# Patient Record
Sex: FEMALE | Race: WHITE | NOT HISPANIC OR LATINO | Employment: OTHER | URBAN - METROPOLITAN AREA
[De-identification: names, ages, dates, MRNs, and addresses within clinical notes are randomized per-mention and may not be internally consistent; named-entity substitution may affect disease eponyms.]

---

## 2017-04-04 ENCOUNTER — AMBULATORY - HEALTHEAST (OUTPATIENT)
Dept: SURGERY | Facility: CLINIC | Age: 67
End: 2017-04-04

## 2017-04-04 DIAGNOSIS — Z98.84 S/P BARIATRIC SURGERY: ICD-10-CM

## 2017-04-04 DIAGNOSIS — K91.2 OTHER AND UNSPECIFIED POSTSURGICAL NONABSORPTION: ICD-10-CM

## 2017-04-04 DIAGNOSIS — E55.9 VITAMIN D DEFICIENCY: ICD-10-CM

## 2017-04-04 DIAGNOSIS — K90.9 UNSPECIFIED INTESTINAL MALABSORPTION: ICD-10-CM

## 2017-08-04 ENCOUNTER — OFFICE VISIT - HEALTHEAST (OUTPATIENT)
Dept: SURGERY | Facility: CLINIC | Age: 67
End: 2017-08-04

## 2017-08-04 DIAGNOSIS — K91.2 POSTOPERATIVE MALABSORPTION: ICD-10-CM

## 2017-08-04 RX ORDER — TRAMADOL HYDROCHLORIDE 50 MG/1
1 TABLET ORAL EVERY MORNING
Status: SHIPPED | COMMUNITY
Start: 2017-06-30

## 2017-08-04 ASSESSMENT — MIFFLIN-ST. JEOR: SCORE: 1251.17

## 2018-08-31 ENCOUNTER — OFFICE VISIT - HEALTHEAST (OUTPATIENT)
Dept: SURGERY | Facility: CLINIC | Age: 68
End: 2018-08-31

## 2018-08-31 DIAGNOSIS — K91.2 POSTOPERATIVE MALABSORPTION: ICD-10-CM

## 2018-08-31 DIAGNOSIS — R63.2 HYPERPHAGIA: ICD-10-CM

## 2018-08-31 RX ORDER — TRIAMCINOLONE ACETONIDE 1 MG/G
1 CREAM TOPICAL 2 TIMES DAILY
Status: SHIPPED | COMMUNITY
Start: 2018-06-18

## 2018-08-31 ASSESSMENT — MIFFLIN-ST. JEOR: SCORE: 1269.32

## 2019-02-12 ENCOUNTER — COMMUNICATION - HEALTHEAST (OUTPATIENT)
Dept: SURGERY | Facility: CLINIC | Age: 69
End: 2019-02-12

## 2019-08-09 ENCOUNTER — AMBULATORY - HEALTHEAST (OUTPATIENT)
Dept: SURGERY | Facility: CLINIC | Age: 69
End: 2019-08-09

## 2019-08-09 ENCOUNTER — COMMUNICATION - HEALTHEAST (OUTPATIENT)
Dept: SURGERY | Facility: CLINIC | Age: 69
End: 2019-08-09

## 2019-08-09 DIAGNOSIS — Z98.84 S/P BARIATRIC SURGERY: ICD-10-CM

## 2019-08-09 DIAGNOSIS — K90.9 INTESTINAL MALABSORPTION: ICD-10-CM

## 2019-08-09 DIAGNOSIS — K91.2 POSTSURGICAL MALABSORPTION: ICD-10-CM

## 2019-12-16 ENCOUNTER — RECORDS - HEALTHEAST (OUTPATIENT)
Dept: ADMINISTRATIVE | Facility: OTHER | Age: 69
End: 2019-12-16

## 2019-12-16 LAB
CHOLEST SERPL-MCNC: 198 MG/DL
HBA1C MFR BLD: 6 % (ref 4.2–5.6)
HDLC SERPL-MCNC: 73 MG/DL
LDLC SERPL CALC-MCNC: 108 MG/DL
NON HDL CHOL. (LDL+VLDL): 125 MG/DL
TRIGLYCERIDES (HISTORICAL CONVERSION): 84 MG/DL

## 2019-12-20 ENCOUNTER — OFFICE VISIT - HEALTHEAST (OUTPATIENT)
Dept: SURGERY | Facility: CLINIC | Age: 69
End: 2019-12-20

## 2019-12-20 DIAGNOSIS — K91.2 POSTOPERATIVE MALABSORPTION: ICD-10-CM

## 2019-12-20 DIAGNOSIS — E89.40 ASYMPTOMATIC POSTPROCEDURAL OVARIAN FAILURE: ICD-10-CM

## 2019-12-20 ASSESSMENT — MIFFLIN-ST. JEOR: SCORE: 1296.53

## 2019-12-24 ENCOUNTER — RECORDS - HEALTHEAST (OUTPATIENT)
Dept: HEALTH INFORMATION MANAGEMENT | Facility: CLINIC | Age: 69
End: 2019-12-24

## 2020-11-11 ENCOUNTER — COMMUNICATION - HEALTHEAST (OUTPATIENT)
Dept: SURGERY | Facility: CLINIC | Age: 70
End: 2020-11-11

## 2020-11-11 DIAGNOSIS — E78.5 HYPERLIPIDEMIA: ICD-10-CM

## 2020-11-11 DIAGNOSIS — Z98.84 S/P BARIATRIC SURGERY: ICD-10-CM

## 2020-11-11 DIAGNOSIS — K90.9 INTESTINAL MALABSORPTION: ICD-10-CM

## 2020-11-11 DIAGNOSIS — K91.2 POSTSURGICAL MALABSORPTION, NOT ELSEWHERE CLASSIFIED: ICD-10-CM

## 2020-12-29 ENCOUNTER — OFFICE VISIT - HEALTHEAST (OUTPATIENT)
Dept: SURGERY | Facility: CLINIC | Age: 70
End: 2020-12-29

## 2020-12-29 DIAGNOSIS — R63.2 HYPERPHAGIA: ICD-10-CM

## 2020-12-29 DIAGNOSIS — R79.89 INCREASED PTH LEVEL: ICD-10-CM

## 2020-12-29 DIAGNOSIS — K91.2 POSTOPERATIVE MALABSORPTION: ICD-10-CM

## 2020-12-29 DIAGNOSIS — R79.0 LOW IRON STORES: ICD-10-CM

## 2020-12-29 RX ORDER — PHENTERMINE HYDROCHLORIDE 37.5 MG/1
TABLET ORAL
Qty: 90 TABLET | Refills: 1 | Status: SHIPPED | OUTPATIENT
Start: 2020-12-29 | End: 2022-03-15

## 2020-12-29 ASSESSMENT — MIFFLIN-ST. JEOR: SCORE: 1233.03

## 2021-05-26 ENCOUNTER — COMMUNICATION - HEALTHEAST (OUTPATIENT)
Dept: SURGERY | Facility: CLINIC | Age: 71
End: 2021-05-26

## 2021-05-26 DIAGNOSIS — K91.2 POSTOPERATIVE MALABSORPTION: ICD-10-CM

## 2021-05-26 DIAGNOSIS — Z98.84 S/P BARIATRIC SURGERY: ICD-10-CM

## 2021-05-26 DIAGNOSIS — R79.0 LOW IRON STORES: ICD-10-CM

## 2021-05-26 DIAGNOSIS — K90.9 INTESTINAL MALABSORPTION: ICD-10-CM

## 2021-05-26 DIAGNOSIS — R79.89 INCREASED PTH LEVEL: ICD-10-CM

## 2021-05-31 VITALS — BODY MASS INDEX: 26.82 KG/M2 | WEIGHT: 161 LBS | HEIGHT: 65 IN

## 2021-05-31 NOTE — PROGRESS NOTES
Almost 5.5 yrs post op lab orders placed for patient and sent to patient via mail in preparation for appointment with .    Patricia Armando RN, N  [facility]  P [phone]  F [phone]

## 2021-06-02 VITALS — WEIGHT: 165 LBS | HEIGHT: 65 IN | BODY MASS INDEX: 27.49 KG/M2

## 2021-06-04 VITALS
RESPIRATION RATE: 16 BRPM | HEIGHT: 65 IN | SYSTOLIC BLOOD PRESSURE: 118 MMHG | WEIGHT: 171 LBS | DIASTOLIC BLOOD PRESSURE: 76 MMHG | BODY MASS INDEX: 28.49 KG/M2 | HEART RATE: 62 BPM | OXYGEN SATURATION: 98 %

## 2021-06-04 NOTE — PATIENT INSTRUCTIONS - HE
Bone Density Scan  Double your vitamin D3 (10,000U) daily for 3 months then continue with 5,000U  Change to a multivitamin containing 18mg of iron and still take 2 daily with food    [facility]  Nutritional Guidelines  Gastric Bypass 18 Months Post Op and Beyond    General Guidelines and Helpful Hints:    Eat 3 meals per day + protein supplement(s). No snacks between meals.  o Do not skip meals.  This can cause overeating at the next meal and will prevent adequate protein and nutritional intake.    Aim for 60-80 grams of protein per day.  o Always eat your protein first. This assists with optimal nutrition and helps you stay full longer.  o Depending on your portion size, you may need to drink approved protein supplement between meals to achieve protein goals. Follow recommendations of your Dietitian.     Eat your protein first, and then follow with fiber.   o It is not necessary to count your fiber, but 15-20 grams per day is recommended.    o Add fiber by including fruits, vegetables, whole grains, and beans.     Portions should remain about 1 cup per meal. Use measuring cups to be accurate.    Continue to use saucer/salad plates, infant/toddler silverware to keep portion sizes small and take small bites.    Eat S-L-O-W-L-Y to make each meal last 20-30 minutes. Always stop eating when satisfied.    Continue to use caution with foods containing skins, peels or membranes. Chew well!    Aim for 64 oz. of calorie-free fluids daily.  o Continue to avoid caffeine and carbonation. If you choose to drink alcohol, do so in moderation.   o Remember to avoid drinking during meals, 15-30 minutes before and 30 minutes after.    Exercise is ballard for continued weight loss and weight maintenance. Aim for 30-60 minutes of physical activity most days of the week. Include cardiovascular and strength training.    If having trouble tolerating meat, try using a crock-pot, tinfoil tent, steamer or other moist cooking  method to create tender meats. Add broth or low-fat gravy to help meat stay moist.     Avoid high sugar and high fat foods to prevent dumping syndrome.  o Check nutrition labels for less than 10 grams of sugar and less than 10 grams of fat per serving.    Continue Taking Vitamins/Minerals:  o 4321-7255 mcg of Sublingual B-12 daily  o 1 Multivitamin with Iron twice daily (chewable or swallow tabs)  o 500-600 mg Calcium Citrate twice daily (chewable or swallow tabs)  o 5000 IU Vitamin D3 daily    Sample Grocery List    Protein:    Fat free Greek or light yogurt (less than 10 grams sugar)    Fat free or low-fat cottage cheese    String cheese or reduced fat cheese slices    Tuna, salmon, crab, egg, or chicken salad made with light or fat free mayonnaise    Egg or Egg Substitute    Lean/extra lean turkey, beef, bison, venison (ground, sirloin, round, flank)    Pork loin or tenderloin (grilled, baked, broiled)    Fish such as salmon, tuna, trout, tilapia, etc. (grilled, baked, broiled)    Tender cuts of lean (skinless) turkey or chicken    Lean deli meats: turkey, lean ham, chicken, lean roast beef    Beans such as kidney, garbanzo, black, villegas, or low-fat/fat free refried beans    Peanut butter (natural preferred). Limit to 1 Tbsp. per day.    Low-fat meatloaf (made with lean ground beef or turkey)    Sloppy Joes made with low-sugar ketchup and lean ground beef or turkey    Soy or vegetable protein (i.e. vegan crumbles, soy/veggie burger, tofu)    Hummus    Vegetables:    Fresh: cooked or raw (as tolerated)    Frozen vegetables    Canned vegetables (low sodium or no salt added, rinse before cooking/eating)    (Ok to have skins/peels/membranes/seeds - just chew well)    Fruits:    Fresh fruit    Frozen fruit (no sugar added)    Canned fruit (packed in its own juice, NOT syrup)    (Ok to have skins/peels/membranes/seeds - just chew well)    Starch:    Unsweetened whole-grain hot cereal (or high fiber cold cereal,  dry)    Toasted whole wheat bread or [place] Thins    Whole grain crackers    Baked /boiled/mashed potato/sweet potato    Cooked whole grain pasta, brown rice, or other cooked whole grains    Starchy vegetables: corn, peas, winter squash    Protein Supplement:     Ready to drink protein shake with:  o 15-30 grams protein per serving  o Less than 10 grams total carbohydrate per serving     Protein powder mixed with:  o  Skim or 1% milk  o Low fat or fat free Lactaid milk, plain or no sugar added soymilk  o Water     Fats: (use in moderation)    1 teaspoon of soft tub margarine    1 teaspoon olive oil, canola oil, or peanut oil    1 tablespoon of low-fat tello or salad dressing     Sample Menu for 18+ months after Gastric Bypass    You do NOT need to eat/drink the full portion sizes listed below  Always stop when you are satisfied    Breakfast   cup 1% cottage cheese     cup mixed berries   Lunch 2 oz lean roast beef on   [place] Thin with 1 tsp. light tello    small tomato, chopped, mixed with 1 tsp. light vinaigrette dressing   Supplement Approved protein supplement (if needed between meals)   Dinner 2 oz grilled salmon    cup salad greens with 1 tsp. light salad dressing and 1 tsp. ground flax seed    cup quinoa or brown rice     Breakfast   cup egg substitute with   cup sautéed chopped vegetables  2 light [place] Krisp crackers   Lunch Tuna Melt:   cup tuna mixed with 1 tsp. light tello over   [place] Thin. Top with 2-3 slices cucumber and 1 oz slice of low fat cheese   Supplement 1 cup skim milk (if needed between meals)   Dinner 3 oz  grilled, broiled, or baked seasoned skinless chicken breast    cup asparagus     Breakfast   cup plain oatmeal made with skim or 1% milk with 1 Tbsp. flavored/unflavored protein powder added  1 mozzarella string cheese   Lunch 2 oz deli turkey breast  1/3 cup salad with 1 tsp. light salad dressing, 1/8 of a whole avocado and 1 Tbsp. sunflower seeds   Dinner 3 oz. pork loin made in a  crock pot, seasoned with a spice rub    cup cooked carrots   Supplement Approved protein supplement (if needed between meals)     Breakfast 1 cup breakfast casserole made with egg substitute, turkey sausage,  and steamed, chopped bell peppers   Supplement  1 cup light Greek yogurt (if needed between meals)   Lunch 2 oz. teriyaki turkey    cup mashed sweet potato with 1-2 spritzes of spray butter (like [place])    cup fresh pineapple   Dinner 3 oz low fat meatloaf    cup roasted garlic zucchini     Breakfast   cup leftover breakfast casserole    cup no sugar added applesauce with 1 Tbsp. unflavored protein powder and a sprinkle of cinnamon    Lunch 3 oz shrimp with 1-2 Tbsp. low-sugar cocktail sauce for dipping    c. whole wheat pasta drizzled with   tsp. olive oil   Supplement 1 cup skim/1% milk with scoop of protein powder (if needed between meals)   Dinner Grilled, seasoned kebob with 2 oz lean beef and   cup vegetables     Breakfast Breakfast pizza:   [place] Thin spread with 1 Tbsp. low sugar spaghetti sauce,   cup shredded low fat cheese, melted and 1 slice of [place] pino     cup fresh fruit mixed with chopped almonds   Lunch   cup black bean soup  4-5 whole grain crackers   Dinner 3 oz  tilapia with lemon pepper seasoning    cup stewed tomatoes   Supplement 1 string cheese (if needed between meals)     Breakfast 2 hard boiled eggs (discard 1 egg yolk)    whole wheat English Muffin with 1 tsp. low sugar jelly   Lunch   cup leftover black bean soup topped with 1-2 Tbsp. low fat cheese  2-3 light [place] Krisp crackers   Supplement Approved protein supplement (if needed between meals)   Dinner 3 oz sirloin steak    cup steamed broccoli

## 2021-06-04 NOTE — PROGRESS NOTES
Bariatric Follow Up Visit with a History of Previous Bariatric Surgery     Date of visit: 12/20/2019  Physician: Carmen Kirby MD  Primary Care Provider:  Birgit Shaw MD Connie QUAN Jaimes   68 y.o.  female    Date of Surgery: 4/7/2014  Initial Weight: 248#  Initial BMI: 41.3  Today's Weight:   Wt Readings from Last 1 Encounters:   12/20/19 171 lb (77.6 kg)     Body mass index is 28.46 kg/m .      Assessment and Plan     Assessment: Ivy is a 68 y.o. year old female who is 6.5 yrs s/p  Birgit en Y Gastric Bypass with Dr. Ruddy Haynes QUAN Theodore feels as if she has achieved the goals she hoped to accomplish through bariatric surgery and weight loss.    Encounter Diagnoses   Name Primary?     Postoperative malabsorption Yes     Asymptomatic postprocedural ovarian failure           Current Outpatient Medications:      acetaminophen (TYLENOL) 325 MG tablet, Take 650 mg by mouth every 6 (six) hours as needed for pain., Disp: , Rfl:      albuterol (PROVENTIL) 2.5 mg /3 mL (0.083 %) nebulizer solution, Take 2.5 mg by nebulization every 6 (six) hours as needed for wheezing., Disp: , Rfl:      calcium citrate (CALCITRATE) 200 mg (950 mg) tablet, Take 1 tablet by mouth 2 (two) times a day., Disp: , Rfl:      cholecalciferol, vitamin D3, 5,000 unit Tab, Take 1 tablet by mouth daily., Disp: , Rfl:      cyanocobalamin, vitamin B-12, 1,000 mcg Subl, Place 1,000 mcg under the tongue daily., Disp: , Rfl:      escitalopram oxalate (LEXAPRO) 20 MG tablet, 1 tablet., Disp: , Rfl:      estradiol (ESTRACE) 0.5 MG tablet, Take 0.5 mg by mouth daily., Disp: , Rfl:      KLOR-CON M20 20 mEq tablet, Take 1 tablet by mouth daily., Disp: , Rfl:      mometasone (NASONEX) 50 mcg/actuation nasal spray, 2 sprays into each nostril as needed. , Disp: , Rfl:      MULTIVITAMINS WITH IRON (MULTIVITAMIN WITH IRON ORAL), Take 1 tablet by mouth 2 times a day at 6:00 am and 4:00 pm., Disp: , Rfl:      phentermine (ADIPEX-P) 37.5 mg tablet,  "Take 1/2 to 1 tablet in the morning., Disp: 90 tablet, Rfl: 1     traMADol (ULTRAM) 50 mg tablet, Take 1 tablet by mouth every morning., Disp: , Rfl:      triamcinolone (KENALOG) 0.1 % cream, Apply 1 application topically 2 (two) times a day., Disp: , Rfl:     Plan: Change to Centrum complete, Women's one a day or generic equivalent containing 18 mg iron (still take 2/d), Double up your D in the winter (take 10,000U daily)  DEXA external order.     Return in about 1 year (around 12/20/2020).    Bariatric Surgery Review     Interim History/LifeChanges: She had her hip replaced October 2018 and is doing well. Her weight is up a little which bothers her. 6# up from 8/2018. Some leg jumping at night.     Patient Concerns: f/u, weight  Appetite (1-10): OK  GERD: no    Medication changes: no    Vitamin Intake:   B-12   SL   MVI  2/d over 50   Vitamin D  5,000   Calcium   citrate     Other                LABS: \"Reviewed   Hgb 11.6, PTH 82, Ferritin 8, A1c 6.0, fasting glucose 92, lipids, other vitamins excellent    Nausea no  Vomiting no  Constipation no  Diarrhea no  Rashes no  Hair Loss no  Calf tenderness no  Breathing difficulty no  Reactive Hypoglycemia yes  Light Headedness occ   Moods OK    12 point ROS as above and otherwise negative      Habits:  Alcohol: no  Tobacco: no  Caffeine diet coke 5-6/d  NSAIDS no  Exercise Routine: daily  3 meals/day yes  Protein first working on it  60 grams/day  Water Separate from meals yes  Calorie Containing Beverages no  Restaurant eating/wk improving and selective  Sleeping 8-9hours  Stress low  CPAP: NA  Contraception: PM    Social History     Social History     Socioeconomic History     Marital status:      Spouse name: Not on file     Number of children: Not on file     Years of education: Not on file     Highest education level: Not on file   Occupational History     Not on file   Social Needs     Financial resource strain: Not on file     Food insecurity:     Worry: " Not on file     Inability: Not on file     Transportation needs:     Medical: Not on file     Non-medical: Not on file   Tobacco Use     Smoking status: Never Smoker     Smokeless tobacco: Never Used   Substance and Sexual Activity     Alcohol use: No     Drug use: No     Sexual activity: Yes     Partners: Male   Lifestyle     Physical activity:     Days per week: Not on file     Minutes per session: Not on file     Stress: Not on file   Relationships     Social connections:     Talks on phone: Not on file     Gets together: Not on file     Attends Restoration service: Not on file     Active member of club or organization: Not on file     Attends meetings of clubs or organizations: Not on file     Relationship status: Not on file     Intimate partner violence:     Fear of current or ex partner: Not on file     Emotionally abused: Not on file     Physically abused: Not on file     Forced sexual activity: Not on file   Other Topics Concern     Not on file   Social History Narrative     Not on file       Past Medical History     Past Medical History:   Diagnosis Date     Back pain      Basal cell carcinoma      Benign positional vertigo      Degenerative disc disease      Degenerative joint disease      Depression      Diabetes mellitus (H)      Dyslipidemia      Exercise-induced asthma      Fibromyalgia      Foot pain      Hip pain      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 Devonte Fox     Hyperparathyroidism (H)      Knee pain      Morbid obesity with BMI of 40.0-44.9, adult (H)      Other and unspecified postsurgical nonabsorption 12/19/2014     Sleep apnea      Urinary, incontinence, stress female      Vitamin D deficiency      Problem List     Patient Active Problem List   Diagnosis     Other and unspecified postsurgical nonabsorption     Hair Loss     Hyperparathyroidism (H)     Abnormal vasomotor function     Anxiety     Arthralgia of hip     Asthma     Dyssomnia     Preoperative evaluation to  rule out surgical contraindication     Essential hypertension     History of total hip replacement     Fibromyalgia     Intestinal bypass and anastomosis status     Impaired glucose tolerance     Hyperlipidemia     Squamous cell carcinoma in situ (SCCIS) of skin     Shoulder pain     Pneumonia     Plantar fasciitis     Mixed anxiety depressive disorder     Periodic limb movement disorder     Osteoarthritis of knee     Osteoarthritis     Medications     Current Outpatient Medications   Medication Sig Note     acetaminophen (TYLENOL) 325 MG tablet Take 650 mg by mouth every 6 (six) hours as needed for pain.      albuterol (PROVENTIL) 2.5 mg /3 mL (0.083 %) nebulizer solution Take 2.5 mg by nebulization every 6 (six) hours as needed for wheezing.      calcium citrate (CALCITRATE) 200 mg (950 mg) tablet Take 1 tablet by mouth 2 (two) times a day.      cholecalciferol, vitamin D3, 5,000 unit Tab Take 1 tablet by mouth daily.      cyanocobalamin, vitamin B-12, 1,000 mcg Subl Place 1,000 mcg under the tongue daily.      escitalopram oxalate (LEXAPRO) 20 MG tablet 1 tablet. 12/19/2014: Received from: External Pharmacy Received Sig:      estradiol (ESTRACE) 0.5 MG tablet Take 0.5 mg by mouth daily.      KLOR-CON M20 20 mEq tablet Take 1 tablet by mouth daily. 12/19/2014: Received from: External Pharmacy Received Sig:      mometasone (NASONEX) 50 mcg/actuation nasal spray 2 sprays into each nostril as needed.       MULTIVITAMINS WITH IRON (MULTIVITAMIN WITH IRON ORAL) Take 1 tablet by mouth 2 times a day at 6:00 am and 4:00 pm.      phentermine (ADIPEX-P) 37.5 mg tablet Take 1/2 to 1 tablet in the morning.      traMADol (ULTRAM) 50 mg tablet Take 1 tablet by mouth every morning. 8/4/2017: Received from: External Pharmacy     triamcinolone (KENALOG) 0.1 % cream Apply 1 application topically 2 (two) times a day. 8/31/2018: Received from: [facility] Received Sig: APPLY  CREAM TOPICALLY TO AFFECTED AREA ONE  TO  TWO  TIMES   "DAILY  AS  NEEDED     Surgical History     Past Surgical History  She has a past surgical history that includes Tonsillectomy; Hysterectomy; Tubal ligation; Knee arthroscopy; Cholecystectomy; Foot surgery; Wrist fracture surgery; and Total abdominal hysterectomy w/ bilateral salpingoophorectomy.    Objective-Exam     Constitutional:  /76   Pulse 62   Resp 16   Ht 5' 5\" (1.651 m)   Wt 171 lb (77.6 kg)   SpO2 98%   BMI 28.46 kg/m    Height: 5' 5\" (1.651 m) (12/20/2019  9:12 AM)  Weight: 171 lb (77.6 kg) (12/20/2019  9:12 AM)  BMI (Calculated): 28.5 (12/20/2019  9:12 AM)  SpO2: 98 % (12/20/2019  9:12 AM)    General:  Pleasant and in no acute distress   Eyes:  EOMI  ENT:  Airway 1+  Moist mucous membranes  Neck:  Supple, No LAD, No thyromegaly, No carotid bruits appreciated  Respiratory: Normal respiratory effort, no cough, wheezes or crackles  CV:  Regular rate and Rhythm,no murmurs, pulses 2+, no calf tenderness, no LE edema  Gastrointestinal: Abdomen NT/ND, BS+  Musculoskeletal: muscle mass WNL  Skin: color tan hair full, incisions nicely healed  Neurological: No tremor, normal gait  Psychiatric: alert and oriented X3, mood and affect normal    Counseling     We reviewed the important post op bariatric recommendations:  -eating 3 meals daily  -eating protein first, getting >60gm protein daily  -eating slowly, chewing food well  -avoiding/limiting calorie containing beverages  -drinking water 15-30 minutes before or after meals  -choosing wheat, not white with breads, crackers, pastas, juan, bagels, tortillas, rice  -limiting restaurant or cafeteria eating to twice a week or less    We discussed the importance of restorative sleep and stress management in maintaining a healthy weight.  We discussed the National Weight Control Registry healthy weight maintenance strategies and ways to optimize metabolism.  We discussed the importance of physical activity including cardiovascular and strength training in " maintaining a healthier weight.    We discussed the importance of life-long vitamin supplementation and life-long  follow-up.    Ivy was reminded that, to avoid marginal ulcers she should avoid tobacco at all, alcohol in excess, caffeine in excess, and NSAIDS (unless indicated for cardioprotection or othewise and opposed by a PPI).    Carmen Kirby MD, [facility].  12/20/2019  9:24 AM      No images are attached to the encounter.   30 minutes spent with patient. >50% in counseling and coordination of care.

## 2021-06-05 VITALS — BODY MASS INDEX: 26.16 KG/M2 | WEIGHT: 157 LBS | HEIGHT: 65 IN

## 2021-06-09 NOTE — PROGRESS NOTES
3 yrs post op lab orders placed for patient and sent to patient via mail in preparation for appointment with  in May.    Patricia Armando RN, [facility]  P [phone]  F [phone]

## 2021-06-12 NOTE — PROGRESS NOTES
Bariatric Follow Up Visit with a History of Previous Bariatric Surgery     Date of visit: 8/4/2017  Physician: Carmen Kirby MD  Primary Care Provider:  MD Joao Valentincailin Jaimes   66 y.o.  female    Date of Surgery: 4/7/2014  Initial Weight: 248#  Initial BMI: 41.3  Today's Weight:   Wt Readings from Last 1 Encounters:   08/04/17 161 lb (73 kg)     Body mass index is 26.79 kg/(m^2).      Assessment and Plan     Assessment: Ivy is a 66 y.o. year old female who is 3 yrs s/p  Birgit en Y Gastric Bypass with Dr. Fox  Ivy Jaimes feels as if she has achieved the goals she hoped to accomplish through bariatric surgery and weight loss.    Encounter Diagnosis   Name Primary?     Postoperative malabsorption Yes         Current Outpatient Prescriptions:      acetaminophen (TYLENOL) 325 MG tablet, Take 650 mg by mouth every 6 (six) hours as needed for pain., Disp: , Rfl:      albuterol (PROVENTIL) 2.5 mg /3 mL (0.083 %) nebulizer solution, Take 2.5 mg by nebulization every 6 (six) hours as needed for wheezing., Disp: , Rfl:      calcium citrate (CALCITRATE) 200 mg (950 mg) tablet, Take 1 tablet by mouth 2 (two) times a day., Disp: , Rfl:      cholecalciferol, vitamin D3, 5,000 unit Tab, Take 1 tablet by mouth daily., Disp: , Rfl:      cyanocobalamin, vitamin B-12, 1,000 mcg Subl, Place 1,000 mcg under the tongue daily., Disp: , Rfl:      escitalopram oxalate (LEXAPRO) 20 MG tablet, 1 tablet., Disp: , Rfl:      KLOR-CON M20 20 mEq tablet, Take 1 tablet by mouth daily., Disp: , Rfl:      mometasone (NASONEX) 50 mcg/actuation nasal spray, 2 sprays into each nostril as needed. , Disp: , Rfl:      MULTIVITAMINS WITH IRON (MULTIVITAMIN WITH IRON ORAL), Take 1 tablet by mouth 2 times a day at 6:00 am and 4:00 pm., Disp: , Rfl:      PREMARIN 0.3 mg tablet, Take 1 tablet by mouth daily., Disp: , Rfl:      traMADol (ULTRAM) 50 mg tablet, Take 1 tablet by mouth every morning., Disp: , Rfl:  "    Plan:    Return in about 1 year (around 8/4/2018).    Bariatric Surgery Review     Interim History/LifeChanges: Doing well. Lactose intolerant now.    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  3 meals/day yes  Protein first yes  60+grams/day  Water Separate from meals yes  Calorie Containing Beverages no  Restaurant eating/wk 2  Sleeping Ok-5-6 hours  Stress not an issue  CPAP: NA  Contraception: hyst    Social History     Social History     Social History     Marital status:      Spouse name: N/A     Number of children: N/A     Years of education: N/A     Occupational History     Not on file.     Social History Main Topics     Smoking status: Never Smoker     Smokeless tobacco: Never Used     Alcohol use No     Drug use: No     Sexual activity: Yes     Partners: Male     Other Topics Concern     Not on file     Social History Narrative       Past Medical History     Past Medical History:   Diagnosis Date     Back pain      Basal cell carcinoma      Benign positional vertigo      Degenerative disc disease      Degenerative joint disease      Depression      Diabetes mellitus      Dyslipidemia      Exercise-induced asthma      Fibromyalgia      Foot pain      Hip pain      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 Devonte Fox     Hyperparathyroidism      Knee pain      Morbid obesity with " BMI of 40.0-44.9, adult      Other and unspecified postsurgical nonabsorption 12/19/2014     Sleep apnea      Urinary, incontinence, stress female      Vitamin D deficiency      Problem List     Patient Active Problem List   Diagnosis     Other and unspecified postsurgical nonabsorption     Hair Loss     Hyperparathyroidism     Medications     Current Outpatient Prescriptions   Medication Sig Note     acetaminophen (TYLENOL) 325 MG tablet Take 650 mg by mouth every 6 (six) hours as needed for pain.      albuterol (PROVENTIL) 2.5 mg /3 mL (0.083 %) nebulizer solution Take 2.5 mg by nebulization every 6 (six) hours as needed for wheezing.      calcium citrate (CALCITRATE) 200 mg (950 mg) tablet Take 1 tablet by mouth 2 (two) times a day.      cholecalciferol, vitamin D3, 5,000 unit Tab Take 1 tablet by mouth daily.      cyanocobalamin, vitamin B-12, 1,000 mcg Subl Place 1,000 mcg under the tongue daily.      escitalopram oxalate (LEXAPRO) 20 MG tablet 1 tablet. 12/19/2014: Received from: External Pharmacy Received Sig:      KLOR-CON M20 20 mEq tablet Take 1 tablet by mouth daily. 12/19/2014: Received from: External Pharmacy Received Sig:      mometasone (NASONEX) 50 mcg/actuation nasal spray 2 sprays into each nostril as needed.       MULTIVITAMINS WITH IRON (MULTIVITAMIN WITH IRON ORAL) Take 1 tablet by mouth 2 times a day at 6:00 am and 4:00 pm.      PREMARIN 0.3 mg tablet Take 1 tablet by mouth daily. 12/19/2014: Received from: External Pharmacy Received Sig:      traMADol (ULTRAM) 50 mg tablet Take 1 tablet by mouth every morning. 8/4/2017: Received from: External Pharmacy     Surgical History     Past Surgical History  She has a past surgical history that includes Tonsillectomy; Hysterectomy; Tubal ligation; Knee arthroscopy; Cholecystectomy; Foot surgery; Wrist fracture surgery; and Total abdominal hysterectomy w/ bilateral salpingoophorectomy.    Objective-Exam     Constitutional:  /82  Pulse (!) 56   "Temp 98  F (36.7  C) (Oral)   Resp 18  Ht 5' 5\" (1.651 m)  Wt 161 lb (73 kg)  SpO2 100%  BMI 26.79 kg/m2  Height: 5' 5\" (1.651 m) (8/4/2017  9:59 AM)  Weight: 161 lb (73 kg) (8/4/2017  9:59 AM)  BMI (Calculated): 26.8 (8/4/2017  9:59 AM)  SpO2: 100 % (8/4/2017  9:59 AM)  General:  Pleasant and in no acute distress   Eyes:  EOMI  ENT:  Airway 1+  Moist mucous membranes  Neck:  Supple, No LAD, No thyromegaly, No carotid bruits appreciated  Respiratory: Normal respiratory effort, no cough, wheezes or crackles  CV:  Regular rate and Rhythm, no murmurs, pulses 2+, no calf tenderness, no LE edema  Gastrointestinal: Abdomen NT/ND, BS+  Musculoskeletal: muscle mass WNL  Skin: color tan hair full, incisions nicely healed  Neurological: No tremor, normal gait  Psychiatric: alert and oriented X3, mood and affect normal    Counseling     We reviewed the important post op bariatric recommendations:  -eating 3 meals daily  -eating protein first, getting >60gm protein daily  -eating slowly, chewing food well  -avoiding/limiting calorie containing beverages  -drinking water 15-30 minutes before or after meals  -choosing wheat, not white with breads, crackers, pastas, juan, bagels, tortillas, rice  -limiting restaurant or cafeteria eating to twice a week or less    We discussed the importance of restorative sleep and stress management in maintaining a healthy weight.  We discussed the National Weight Control Registry healthy weight maintenance strategies and ways to optimize metabolism.  We discussed the importance of physical activity including cardiovascular and strength training in maintaining a healthier weight.    We discussed the importance of life-long vitamin supplementation and life-long  follow-up.    Ivy was reminded that, to avoid marginal ulcers she should avoid tobacco at all, alcohol in excess, caffeine in excess, and NSAIDS (unless indicated for cardioprotection or othewise and opposed by a PPI).    Carmen " Shamar Kirby MD, FAAFP  [facility].  8/4/2017  10:18 AM     30 minutes spent with patient. >50% in counseling and coordination of care.

## 2021-06-13 NOTE — TELEPHONE ENCOUNTER
6 years post-op RNY  lab orders placed for patient and will be done at a [facility] lab  in preparation for appointment with Carmen Kirby MD on 12/29/2020.  Sent to the patient in the mail.    Mara Robb RN, [facility]

## 2021-06-13 NOTE — TELEPHONE ENCOUNTER
Pt would like hard copy of lab orders sent to her so she can complete labs in [place] before appt on 12/29    [phone]  **ok to leave a detailed message

## 2021-06-14 NOTE — PROGRESS NOTES
"Ivy Jaimes is a 70 y.o. female who is being evaluated via a billable telephone visit.      The patient has been notified of following:     \"This telephone visit will be conducted via a call between you and your physician/provider. We have found that certain health care needs can be provided without the need for a physical exam.  This service lets us provide the care you need with a short phone conversation.  If a prescription is necessary we can send it directly to your pharmacy.  If lab work is needed we can place an order for that and you can then stop by our lab to have the test done at a later time.    Telephone visits are billed at different rates depending on your insurance coverage. During this emergency period, for some insurers they may be billed the same as an in-person visit.  Please reach out to your insurance provider with any questions.    If during the course of the call the physician/provider feels a telephone visit is not appropriate, you will not be charged for this service.\"    Patient has given verbal consent to a Telephone visit? Yes    What phone number would you like to be contacted at? [phone]    Patient would like to receive their AVS by AVS Preference: Mail a copy.    Additional provider notes:   Ivy Jaimes is a 70 y.o. female who is being evaluated via a billable telephone visit.      The patient has been notified of following:     \"This telephone visit will be conducted via a call between you and your physician/provider. We have found that certain health care needs can be provided without the need for a physical exam.  This service lets us provide the care you need with a short phone conversation.  If a prescription is necessary we can send it directly to your pharmacy.  If lab work is needed we can place an order for that and you can then stop by our lab to have the test done at a later time.    If during the course of the call the physician/provider feels a telephone " "visit is not appropriate, you will not be charged for this service.\"     Ivy Jaimes complains of  postoperative malabsorption.    I have reviewed and updated the patient's Past Medical History, Social History, Family History and Medication List.    ALLERGIES  Morphine, Hydroxyzine, Morphine (pf), Nsaids (non-steroidal anti-inflammatory drug), Relafen [nabumetone], Tylenol arthritis [acetaminophen], and Ultram [tramadol]      Assessment/Plan:    1. Postoperative malabsorption     2. Increased PTH level     3. Low iron stores     4. Hyperphagia  phentermine (ADIPEX-P) 37.5 mg tablet          I have reviewed the note as documented above.  This accurately captures the substance of my conversation with the patient.    Phone call contact time 30 min.    Call Started at: 11:15  Call Ended at: 11:45      Signature:  Carmen Kirby MD, [facility]      Bariatric Follow Up Visit with a History of Previous Bariatric Surgery     Date of visit: 12/29/2020  Physician: Carmen Kirby MD  Primary Care Provider:  Birgit Shaw MD Conncailin Jaimes   70 y.o.  female    Date of Surgery: 4/7/2014  Initial Weight: 248#  Initial BMI: 41.3  Today's Weight:   Wt Readings from Last 1 Encounters:   12/29/20 157 lb (71.2 kg)     Body mass index is 26.13 kg/m .      Assessment and Plan     Assessment: Ivy is a 70 y.o. year old female who is 6 yrs 8 months s/p  VBG with Dr. Ruddy Haynes QUAN Theodore feels as if she has achieved the goals she hoped to accomplish through bariatric surgery and weight loss.    Encounter Diagnoses   Name Primary?     Postoperative malabsorption Yes     Increased PTH level      Low iron stores      Hyperphagia          Current Outpatient Medications:      acetaminophen (TYLENOL) 325 MG tablet, Take 650 mg by mouth every 6 (six) hours as needed for pain., Disp: , Rfl:      albuterol (PROVENTIL) 2.5 mg /3 mL (0.083 %) nebulizer solution, Take 2.5 mg by nebulization every 6 (six) hours as needed for " wheezing., Disp: , Rfl:      calcium citrate (CALCITRATE) 200 mg (950 mg) tablet, Take 1 tablet by mouth 2 (two) times a day., Disp: , Rfl:      cholecalciferol, vitamin D3, 5,000 unit Tab, Take 1 tablet by mouth daily., Disp: , Rfl:      cyanocobalamin, vitamin B-12, 1,000 mcg Subl, Place 1,000 mcg under the tongue daily., Disp: , Rfl:      escitalopram oxalate (LEXAPRO) 20 MG tablet, 1 tablet., Disp: , Rfl:      KLOR-CON M20 20 mEq tablet, Take 1 tablet by mouth daily., Disp: , Rfl:      mometasone (NASONEX) 50 mcg/actuation nasal spray, 2 sprays into each nostril as needed. , Disp: , Rfl:      MULTIVITAMINS WITH IRON (MULTIVITAMIN WITH IRON ORAL), Take 1 tablet by mouth 2 times a day at 6:00 am and 4:00 pm., Disp: , Rfl:      phentermine (ADIPEX-P) 37.5 mg tablet, Take 1/2 to 1 tablet in the morning., Disp: 90 tablet, Rfl: 1     traMADol (ULTRAM) 50 mg tablet, Take 1 tablet by mouth every morning., Disp: , Rfl:      triamcinolone (KENALOG) 0.1 % cream, Apply 1 application topically 2 (two) times a day., Disp: , Rfl:     Plan: Take 2 MVIs this will double your iron and bring up your hemoglobin. Double your vitamin D through the winter. Add ca++ citrate.  Keep eating protein first, managing stress, walking and protecting your sleep. These measures will help keep your blood sugars down.     No follow-ups on file.    Bariatric Surgery Review     Interim History/LifeChanges: Her weight went up with COVID and she cut back on what she was eating and increased her exercise and lost the weight. Her top weight was June 30. She has been lifting rocks and injured her finger. Her labs show that her iron stores are low and PTH is stable and mildly elevated. She was on a biotin supplement which she believes may affect her other labs.     Patient Concerns: knee injury recently  Appetite (1-10): OK  GERD: no    Medication changes: ultram for knee    Vitamin Intake:   B-12   SL   MVI  1/d   Vitamin D  5,000U   Calcium   almond  "milk     Other  voltaren cream              LABS: \"Reviewed  Iron low PTH elevated  Nausea no  Vomiting no  Constipation no  Diarrhea no  Rashes no  Hair Loss no  Calf tenderness no  Breathing difficulty no  Reactive Hypoglycemia avoids  Light Headedness once in a while   Moods stable    12 point ROS as above and otherwise negative      Habits:  Alcohol: no  Tobacco: no  Caffeine no  NSAIDS no  Exercise Routine: active!!! Moving rocks!  3 meals/day yes  Protein first yes  60grams/day  Water Separate from meals yes  Calorie Containing Beverages no  Restaurant eating/wk 0  Sleeping OK  Stress moderate  CPAP: NA  Contraception: PM  DEXA:2016 normal. Call primary to schedule    Social History     Social History     Socioeconomic History     Marital status:      Spouse name: Not on file     Number of children: Not on file     Years of education: Not on file     Highest education level: Not on file   Occupational History     Not on file   Social Needs     Financial resource strain: Not on file     Food insecurity     Worry: Not on file     Inability: Not on file     Transportation needs     Medical: Not on file     Non-medical: Not on file   Tobacco Use     Smoking status: Never Smoker     Smokeless tobacco: Never Used   Substance and Sexual Activity     Alcohol use: No     Drug use: No     Sexual activity: Yes     Partners: Male   Lifestyle     Physical activity     Days per week: Not on file     Minutes per session: Not on file     Stress: Not on file   Relationships     Social connections     Talks on phone: Not on file     Gets together: Not on file     Attends Jain service: Not on file     Active member of club or organization: Not on file     Attends meetings of clubs or organizations: Not on file     Relationship status: Not on file     Intimate partner violence     Fear of current or ex partner: Not on file     Emotionally abused: Not on file     Physically abused: Not on file     Forced sexual " activity: Not on file   Other Topics Concern     Not on file   Social History Narrative     Not on file       Past Medical History     Past Medical History:   Diagnosis Date     Back pain      Basal cell carcinoma      Benign positional vertigo      Degenerative disc disease      Degenerative joint disease      Depression      Diabetes mellitus (H)      Dyslipidemia      Exercise-induced asthma      Fibromyalgia      Foot pain      Hip pain      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 Devonte Fox     Hyperparathyroidism (H)      Knee pain      Morbid obesity with BMI of 40.0-44.9, adult (H)      Other and unspecified postsurgical nonabsorption 12/19/2014     Sleep apnea      Urinary, incontinence, stress female      Vitamin D deficiency      Problem List     Patient Active Problem List   Diagnosis     Other and unspecified postsurgical nonabsorption     Hair Loss     Hyperparathyroidism (H)     Abnormal vasomotor function     Anxiety     Arthralgia of hip     Asthma     Dyssomnia     Preoperative evaluation to rule out surgical contraindication     Essential hypertension     History of total hip replacement     Fibromyalgia     Intestinal bypass and anastomosis status     Impaired glucose tolerance     Hyperlipidemia     Squamous cell carcinoma in situ (SCCIS) of skin     Shoulder pain     Pneumonia     Plantar fasciitis     Mixed anxiety depressive disorder     Periodic limb movement disorder     Osteoarthritis of knee     Osteoarthritis     Recurrent major depression in complete remission (H)     Prediabetes     Medications     Current Outpatient Medications   Medication Sig Note     acetaminophen (TYLENOL) 325 MG tablet Take 650 mg by mouth every 6 (six) hours as needed for pain.      albuterol (PROVENTIL) 2.5 mg /3 mL (0.083 %) nebulizer solution Take 2.5 mg by nebulization every 6 (six) hours as needed for wheezing.      calcium citrate (CALCITRATE) 200 mg (950 mg) tablet Take 1 tablet by mouth  "2 (two) times a day.      cholecalciferol, vitamin D3, 5,000 unit Tab Take 1 tablet by mouth daily.      cyanocobalamin, vitamin B-12, 1,000 mcg Subl Place 1,000 mcg under the tongue daily.      escitalopram oxalate (LEXAPRO) 20 MG tablet 1 tablet. 12/19/2014: Received from: External Pharmacy Received Sig:      KLOR-CON M20 20 mEq tablet Take 1 tablet by mouth daily. 12/19/2014: Received from: External Pharmacy Received Sig:      mometasone (NASONEX) 50 mcg/actuation nasal spray 2 sprays into each nostril as needed.       MULTIVITAMINS WITH IRON (MULTIVITAMIN WITH IRON ORAL) Take 1 tablet by mouth 2 times a day at 6:00 am and 4:00 pm.      phentermine (ADIPEX-P) 37.5 mg tablet Take 1/2 to 1 tablet in the morning.      traMADol (ULTRAM) 50 mg tablet Take 1 tablet by mouth every morning. 8/4/2017: Received from: External Pharmacy     triamcinolone (KENALOG) 0.1 % cream Apply 1 application topically 2 (two) times a day. 8/31/2018: Received from: [facility] Received Sig: APPLY  CREAM TOPICALLY TO AFFECTED AREA ONE  TO  TWO  TIMES  DAILY  AS  NEEDED     Surgical History     Past Surgical History  She has a past surgical history that includes Tonsillectomy; Hysterectomy; Tubal ligation; Knee arthroscopy; Cholecystectomy; Foot surgery; Wrist fracture surgery; and Total abdominal hysterectomy w/ bilateral salpingoophorectomy.    Objective-Exam     Constitutional:  Ht 5' 5\" (1.651 m)   Wt 157 lb (71.2 kg)   BMI 26.13 kg/m    Height: 5' 5\" (1.651 m) (12/29/2020 10:00 AM)  Initial Weight: 248 lbs (12/29/2020 10:00 AM)  Weight: 157 lb (71.2 kg) (12/29/2020 10:00 AM)  Weight loss from initial: 91 (12/29/2020 10:00 AM)  % Weight loss: 36.69 % (12/29/2020 10:00 AM)  BMI (Calculated): 26.1 (12/29/2020 10:00 AM)    General:  Pleasant and in no acute distress     Psychiatric: alert and oriented X3, mood and affect normal    Counseling     We reviewed the important post op bariatric recommendations:  -eating 3 meals daily  -eating " protein first, getting >60gm protein daily  -eating slowly, chewing food well  -avoiding/limiting calorie containing beverages  -drinking water 15-30 minutes before or after meals  -choosing wheat, not white with breads, crackers, pastas, juan, bagels, tortillas, rice  -limiting restaurant or cafeteria eating to twice a week or less    We discussed the importance of restorative sleep and stress management in maintaining a healthy weight.  We discussed the National Weight Control Registry healthy weight maintenance strategies and ways to optimize metabolism.  We discussed the importance of physical activity including cardiovascular and strength training in maintaining a healthier weight.    We discussed the importance of life-long vitamin supplementation and life-long  follow-up.    Ivy was reminded that, to avoid marginal ulcers she should avoid tobacco at all, alcohol in excess, caffeine in excess, and NSAIDS (unless indicated for cardioprotection or othewise and opposed by a PPI).    Carmen Kirby MD, FAAFP  [facility].  12/29/2020  11:27 AM          Phone call duration: 30 minutes    Carmen Kirby MD

## 2021-06-14 NOTE — PATIENT INSTRUCTIONS - HE
Plan: Take 2 Multivitamins this will double your iron and bring up your hemoglobin. Double your vitamin D through the winter. Add ca++ citrate-this will help to keep your PTH down and your calcium level up.  Keep eating protein first, managing stress, walking and protecting your sleep. These measures will help keep your blood sugars down. Contact Bette when you have your MRI tomorrow to schedule a follow up bone density scan. Follow up labs in 6 months. The nurses will call to remind you:-)

## 2021-06-16 PROBLEM — R09.89 ABNORMAL VASOMOTOR FUNCTION: Status: ACTIVE | Noted: 2017-07-11

## 2021-06-16 PROBLEM — Z98.0 INTESTINAL BYPASS AND ANASTOMOSIS STATUS: Status: ACTIVE | Noted: 2017-07-11

## 2021-06-16 PROBLEM — M25.519 SHOULDER PAIN: Status: ACTIVE | Noted: 2019-12-20

## 2021-06-16 PROBLEM — R73.02 IMPAIRED GLUCOSE TOLERANCE: Status: ACTIVE | Noted: 2017-07-11

## 2021-06-16 PROBLEM — D04.9 SQUAMOUS CELL CARCINOMA IN SITU (SCCIS) OF SKIN: Status: ACTIVE | Noted: 2018-01-18

## 2021-06-16 PROBLEM — R73.03 PREDIABETES: Status: ACTIVE | Noted: 2017-07-11

## 2021-06-16 PROBLEM — F41.9 ANXIETY: Status: ACTIVE | Noted: 2017-07-11

## 2021-06-16 PROBLEM — Z01.818 PREOPERATIVE EVALUATION TO RULE OUT SURGICAL CONTRAINDICATION: Status: ACTIVE | Noted: 2019-12-20

## 2021-06-16 PROBLEM — Z96.649 HISTORY OF TOTAL HIP REPLACEMENT: Status: ACTIVE | Noted: 2019-12-20

## 2021-06-16 PROBLEM — M25.559 ARTHRALGIA OF HIP: Status: ACTIVE | Noted: 2017-06-28

## 2021-06-18 NOTE — LETTER
Letter by Carmen Mcpherson MD at      Author: Carmen Mcpherson MD Service: -- Author Type: --    Filed:  Encounter Date: 2/12/2019 Status: (Other)       Ivy Jaimes   [address] [place] [address]               February 12, 2019      Dear Ivy:    We are sorry that you missed your appointment with Dr. Kirby  on 2/12/2019. Your health and follow-up medical care are important to us. Please call our office as soon as possible so that we may reschedule your appointment. If you have already rescheduled your appointment, please disregard this letter.    Sincerely,        Carmen Kirby MD

## 2021-06-19 NOTE — LETTER
Letter by Carmen Mcpherson MD at      Author: Carmen Mcpherson MD Service: -- Author Type: --    Filed:  Encounter Date: 8/9/2019 Status: (Other)       8/9/2019            Ivy,      I apologize for any inconvenience it may cause, but we need to cancel your appointment with  on 8/30/2019 due to the doctor being out of the office and have been unable to reach you by phone.      Please give us a call at [phone] so we can get that rescheduled as soon as possible.      I have included orders for your annual post operative labs that you can have drawn at your clinic anytime now.      Please give me a call at [phone] if you have any questions.       Take care,              Patricia Armando RN, N  [facility]  P [phone]  F [phone]

## 2021-06-20 NOTE — PROGRESS NOTES
Bariatric Follow Up Visit with a History of Previous Bariatric Surgery     Date of visit: 8/31/2018  Physician: Carmen Kirby MD  Primary Care Provider:  MD Ivy Valentin QUAN Jaimes   67 y.o.  female    Date of Surgery: 4/7/2014  Initial Weight: 248#  Initial BMI: 41.3  Today's Weight:   Wt Readings from Last 1 Encounters:   08/31/18 165 lb (74.8 kg)     Body mass index is 27.46 kg/(m^2).      Assessment and Plan     Assessment: Ivy is a 67 y.o. year old female who is 4 yrs s/p  Birgit en Y Gastric Bypass with Dr. Ruddy Haynes QUAN Jaimes feels as if she has achieved the goals she hoped to accomplish through bariatric surgery and weight loss.    Encounter Diagnoses   Name Primary?     Postoperative malabsorption Yes     Hyperphagia          Current Outpatient Prescriptions:      acetaminophen (TYLENOL) 325 MG tablet, Take 650 mg by mouth every 6 (six) hours as needed for pain., Disp: , Rfl:      albuterol (PROVENTIL) 2.5 mg /3 mL (0.083 %) nebulizer solution, Take 2.5 mg by nebulization every 6 (six) hours as needed for wheezing., Disp: , Rfl:      calcium citrate (CALCITRATE) 200 mg (950 mg) tablet, Take 1 tablet by mouth 2 (two) times a day., Disp: , Rfl:      cholecalciferol, vitamin D3, 5,000 unit Tab, Take 1 tablet by mouth daily., Disp: , Rfl:      cyanocobalamin, vitamin B-12, 1,000 mcg Subl, Place 1,000 mcg under the tongue daily., Disp: , Rfl:      escitalopram oxalate (LEXAPRO) 20 MG tablet, 1 tablet., Disp: , Rfl:      estradiol (ESTRACE) 1 MG tablet, Take 1 mg by mouth daily., Disp: , Rfl:      ferrous gluconate 324 mg (37.5 mg iron) Tab, Take 37.5 mg of iron by mouth daily., Disp: , Rfl:      KLOR-CON M20 20 mEq tablet, Take 1 tablet by mouth daily., Disp: , Rfl:      mometasone (NASONEX) 50 mcg/actuation nasal spray, 2 sprays into each nostril as needed. , Disp: , Rfl:      MULTIVITAMINS WITH IRON (MULTIVITAMIN WITH IRON ORAL), Take 1 tablet by mouth 2 times a day at 6:00 am and  "4:00 pm., Disp: , Rfl:      traMADol (ULTRAM) 50 mg tablet, Take 1 tablet by mouth every morning., Disp: , Rfl:      triamcinolone (KENALOG) 0.1 % cream, Apply 1 application topically 2 (two) times a day., Disp: , Rfl:      phentermine (ADIPEX-P) 37.5 mg tablet, Take 1/2 to 1 tablet in the morning., Disp: 90 tablet, Rfl: 1    Plan: phentermine, consistent physical activity. Keep up strength training while recovering from future hip replacement    Return in about 6 months (around 2/28/2019).    Bariatric Surgery Review     Interim History/LifeChanges: She is up in weight. Her labs are excellent. She is life guarding, teaching,   Her mother is in early stages of dementia. She is struggling to get the 15# off. She would like something for that.  She has noticed a big problem with hip pain with the weight gain.    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     Social History Main Topics     Smoking status: Never Smoker     Smokeless tobacco: Never Used     Alcohol use No "     Drug use: No     Sexual activity: Yes     Partners: Male     Other Topics Concern     Not on file     Social History Narrative       Past Medical History     Past Medical History:   Diagnosis Date     Back pain      Basal cell carcinoma      Benign positional vertigo      Degenerative disc disease      Degenerative joint disease      Depression      Diabetes mellitus (H)      Dyslipidemia      Exercise-induced asthma      Fibromyalgia      Foot pain      Hip pain      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 Devonte Fox     Hyperparathyroidism (H)      Knee pain      Morbid obesity with BMI of 40.0-44.9, adult (H)      Other and unspecified postsurgical nonabsorption 12/19/2014     Sleep apnea      Urinary, incontinence, stress female      Vitamin D deficiency      Problem List     Patient Active Problem List   Diagnosis     Other and unspecified postsurgical nonabsorption     Hair Loss     Hyperparathyroidism (H)     Medications     Current Outpatient Prescriptions   Medication Sig Note     acetaminophen (TYLENOL) 325 MG tablet Take 650 mg by mouth every 6 (six) hours as needed for pain.      albuterol (PROVENTIL) 2.5 mg /3 mL (0.083 %) nebulizer solution Take 2.5 mg by nebulization every 6 (six) hours as needed for wheezing.      calcium citrate (CALCITRATE) 200 mg (950 mg) tablet Take 1 tablet by mouth 2 (two) times a day.      cholecalciferol, vitamin D3, 5,000 unit Tab Take 1 tablet by mouth daily.      cyanocobalamin, vitamin B-12, 1,000 mcg Subl Place 1,000 mcg under the tongue daily.      escitalopram oxalate (LEXAPRO) 20 MG tablet 1 tablet. 12/19/2014: Received from: External Pharmacy Received Sig:      estradiol (ESTRACE) 1 MG tablet Take 1 mg by mouth daily. 8/31/2018: Received from: [facility] Received Sig: Take 1 tablet (1 mg total) by mouth daily. Increased due to increased hot flashes     ferrous gluconate 324 mg (37.5 mg iron) Tab Take 37.5 mg of iron by mouth daily. 8/31/2018:  "Received from: [facility] Received Sig: Take 1 tablet (37.5 mg of iron total) by mouth daily.     KLOR-CON M20 20 mEq tablet Take 1 tablet by mouth daily. 12/19/2014: Received from: External Pharmacy Received Sig:      mometasone (NASONEX) 50 mcg/actuation nasal spray 2 sprays into each nostril as needed.       MULTIVITAMINS WITH IRON (MULTIVITAMIN WITH IRON ORAL) Take 1 tablet by mouth 2 times a day at 6:00 am and 4:00 pm.      traMADol (ULTRAM) 50 mg tablet Take 1 tablet by mouth every morning. 8/4/2017: Received from: External Pharmacy     triamcinolone (KENALOG) 0.1 % cream Apply 1 application topically 2 (two) times a day. 8/31/2018: Received from: [facility] Received Sig: APPLY  CREAM TOPICALLY TO AFFECTED AREA ONE  TO  TWO  TIMES  DAILY  AS  NEEDED     phentermine (ADIPEX-P) 37.5 mg tablet Take 1/2 to 1 tablet in the morning.      Surgical History     Past Surgical History  She has a past surgical history that includes Tonsillectomy; Hysterectomy; Tubal ligation; Knee arthroscopy; Cholecystectomy; Foot surgery; Wrist fracture surgery; and Total abdominal hysterectomy w/ bilateral salpingoophorectomy.    Objective-Exam     Constitutional:  /75  Pulse (!) 55  Temp 98  F (36.7  C)  Resp 16  Ht 5' 5\" (1.651 m)  Wt 165 lb (74.8 kg)  BMI 27.46 kg/m2  Height: 5' 5\" (1.651 m) (8/31/2018  9:55 AM)  Initial Weight: 248 lbs (8/31/2018  9:55 AM)  Weight: 165 lb (74.8 kg) (8/31/2018  9:55 AM)  Weight loss from initial: 83 (8/31/2018  9:55 AM)  % Weight loss: 33.47 % (8/31/2018  9:55 AM)  BMI (Calculated): 27.5 (8/31/2018  9:55 AM)  Body fat percentage: 37.5 (8/31/2018  9:55 AM)  NSAIDS: No (8/31/2018  9:55 AM)  Pain Scale: 0 (8/31/2018  9:55 AM)  General:  Pleasant and in no acute distress   Eyes:  EOMI  ENT:  Airway 1+  Moist mucous membranes  Neck:  Supple, No LAD, No thyromegaly, No carotid bruits appreciated  Respiratory: Normal respiratory effort, no cough, wheezes or crackles  CV:  Regular rate and " Rhythm,no murmurs, pulses 2+, no calf tenderness, no LE edema  Gastrointestinal: Abdomen NT/ND, BS+  Musculoskeletal: muscle mass WNL  Skin: color tan hair pulled back, incisions nicely healed  Neurological: No tremor, normal gait  Psychiatric: alert and oriented X3, mood and affect normal    Counseling     We reviewed the important post op bariatric recommendations:  -eating 3 meals daily  -eating protein first, getting >60gm protein daily  -eating slowly, chewing food well  -avoiding/limiting calorie containing beverages  -drinking water 15-30 minutes before or after meals  -choosing wheat, not white with breads, crackers, pastas, juan, bagels, tortillas, rice  -limiting restaurant or cafeteria eating to twice a week or less    We discussed the importance of restorative sleep and stress management in maintaining a healthy weight.  We discussed the National Weight Control Registry healthy weight maintenance strategies and ways to optimize metabolism.  We discussed the importance of physical activity including cardiovascular and strength training in maintaining a healthier weight.    We discussed the importance of life-long vitamin supplementation and life-long  follow-up.    Ivy was reminded that, to avoid marginal ulcers she should avoid tobacco at all, alcohol in excess, caffeine in excess, and NSAIDS (unless indicated for cardioprotection or othewise and opposed by a PPI).    Carmen Kirby MD, [facility].  8/31/2018  10:53 AM      No images are attached to the encounter.   30 minutes spent with patient. >50% in counseling and coordination of care.

## 2021-06-25 NOTE — TELEPHONE ENCOUNTER
Pt would like the orders mailed to her at home and she will take them to [facility] in [place].     Patricia Armando RN, CBN  [facility]  P [phone]  F [phone]

## 2021-06-25 NOTE — TELEPHONE ENCOUNTER
----- Message from Carmen Kirby MD sent at 12/29/2020 11:54 AM CST -----  Regarding: f/u labs  PTH, D, CMP, Hgb, ferritin, A1c. Thanks!

## 2021-07-04 NOTE — ADDENDUM NOTE
Addendum Note by Patricia Peters, RN at 6/9/2021  3:41 PM     Author: Patricia Peters, RN Service: -- Author Type: Registered Nurse    Filed: 6/9/2021  3:41 PM Encounter Date: 5/26/2021 Status: Signed    : Patricia Peters, RN (Registered Nurse)    Addended by: PATRICIA PETERS on: 6/9/2021 03:41 PM        Modules accepted: Orders

## 2021-08-13 ENCOUNTER — TELEPHONE (OUTPATIENT)
Dept: SURGERY | Facility: CLINIC | Age: 71
End: 2021-08-13

## 2021-08-13 NOTE — TELEPHONE ENCOUNTER
Per ;  Please let Ivy know that her hemoglobin has improved over the past 6 months and is almost normal. Her vitamin D and blood sugars are about the same with a PTH mildly elevated at 95 and an A1c of 6.1. Encourage her to boost her vitamin D-sun exposure helps too (she is usually good at getting outside). For blood sugars-continue to protect sleep, manage stress, eat protein first and maintain consistent physical activity. Her annual f/up is in 6 months or less so we will recheck all then.

## 2021-08-13 NOTE — TELEPHONE ENCOUNTER
Discussed labs with pt today and she verbalized understanding. Made her an appointment for her annual visit with  in April 2022. Pt will also see  next month for a phentermine refill telephone appointment.     Patricia Armando RN, CBN  [facility]  P [phone]  F [phone]

## 2021-08-13 NOTE — TELEPHONE ENCOUNTER
Called pt to discuss lab results and left her a message asking her to call back.    Patricia Armando RN, CBN  [facility]  P [phone]  F [phone]

## 2022-02-01 ENCOUNTER — MYC MEDICAL ADVICE (OUTPATIENT)
Dept: SURGERY | Facility: CLINIC | Age: 72
End: 2022-02-01
Payer: MEDICARE

## 2022-02-01 DIAGNOSIS — K90.9 INTESTINAL MALABSORPTION: ICD-10-CM

## 2022-02-01 DIAGNOSIS — Z98.84 S/P BARIATRIC SURGERY: ICD-10-CM

## 2022-02-01 DIAGNOSIS — K91.2 POSTOPERATIVE MALABSORPTION: ICD-10-CM

## 2022-02-01 DIAGNOSIS — R79.89 INCREASED PTH LEVEL: ICD-10-CM

## 2022-02-01 DIAGNOSIS — R79.0 LOW IRON STORES: Primary | ICD-10-CM

## 2022-03-15 ENCOUNTER — OFFICE VISIT (OUTPATIENT)
Dept: SURGERY | Facility: CLINIC | Age: 72
End: 2022-03-15
Payer: MEDICARE

## 2022-03-15 VITALS
SYSTOLIC BLOOD PRESSURE: 136 MMHG | DIASTOLIC BLOOD PRESSURE: 78 MMHG | BODY MASS INDEX: 29.2 KG/M2 | WEIGHT: 175.3 LBS | HEIGHT: 65 IN

## 2022-03-15 DIAGNOSIS — Z78.0 POST-MENOPAUSAL: ICD-10-CM

## 2022-03-15 DIAGNOSIS — R63.2 HYPERPHAGIA: ICD-10-CM

## 2022-03-15 DIAGNOSIS — K91.2 POSTOPERATIVE MALABSORPTION: Primary | ICD-10-CM

## 2022-03-15 DIAGNOSIS — Z88.6 NSAID SENSITIVITY: ICD-10-CM

## 2022-03-15 PROCEDURE — 99214 OFFICE O/P EST MOD 30 MIN: CPT | Performed by: FAMILY MEDICINE

## 2022-03-15 RX ORDER — MUPIROCIN 20 MG/G
OINTMENT TOPICAL
COMMUNITY
Start: 2022-03-08

## 2022-03-15 RX ORDER — FUROSEMIDE 40 MG
40 TABLET ORAL
COMMUNITY

## 2022-03-15 RX ORDER — POTASSIUM CHLORIDE 1500 MG/1
TABLET, EXTENDED RELEASE ORAL
COMMUNITY
Start: 2021-09-28

## 2022-03-15 RX ORDER — PHENTERMINE HYDROCHLORIDE 37.5 MG/1
TABLET ORAL
Qty: 90 TABLET | Refills: 1 | Status: SHIPPED | OUTPATIENT
Start: 2022-03-15 | End: 2023-03-16

## 2022-03-15 RX ORDER — ESTRADIOL 0.5 MG/1
TABLET ORAL
COMMUNITY
Start: 2021-04-30

## 2022-03-15 RX ORDER — VALACYCLOVIR HYDROCHLORIDE 1 G/1
TABLET, FILM COATED ORAL
COMMUNITY
Start: 2022-02-19

## 2022-03-15 RX ORDER — AMOXICILLIN 500 MG/1
CAPSULE ORAL
COMMUNITY
Start: 2021-03-29

## 2022-03-15 RX ORDER — CARVEDILOL 6.25 MG/1
6.25 TABLET ORAL
COMMUNITY

## 2022-03-15 NOTE — LETTER
3/15/2022         RE: Ivy Jaimes   [address] [place] [address]        Dear Colleague,    Thank you for referring your patient, Ivy Jaimes, to the [facility] [place]. Please see a copy of my visit note below.    Bariatric Follow Up Visit with a History of Previous Bariatric Surgery     Date of visit: 3/15/2022  Physician: Carmen Kirby MD, MD  Primary Care Provider:  Birgit Man  Ivy Jaimes   71 year old  female    Date of Surgery: 4/7/2014  Initial Weight: 243  Initial BMI: 41.3  Today's Weight:   Wt Readings from Last 1 Encounters:   03/15/22 79.5 kg (175 lb 4.8 oz)     Body mass index is 29.17 kg/m .      Assessment and Plan     Assessment: Ivy is a 71 year old year old female who is 8  yrs s/p  Birgit en Y Gastric Bypass with Dr. Fox  Ivy Jaimes feels as if she has achieved the goals she hoped to accomplish through bariatric surgery and weight loss.    Encounter Diagnoses   Name Primary?     Postoperative malabsorption Yes     Post-menopausal      Hyperphagia      NSAID sensitivity          Current Outpatient Medications:      acetaminophen (TYLENOL) 325 MG tablet, [ACETAMINOPHEN (TYLENOL) 325 MG TABLET] Take 650 mg by mouth every 6 (six) hours as needed for pain., Disp: , Rfl:      albuterol (PROVENTIL) 2.5 mg /3 mL (0.083 %) nebulizer solution, [ALBUTEROL (PROVENTIL) 2.5 MG /3 ML (0.083 %) NEBULIZER SOLUTION] Take 2.5 mg by nebulization every 6 (six) hours as needed for wheezing., Disp: , Rfl:      aspirin (ASA) 81 MG EC tablet, Take 81 mg by mouth, Disp: , Rfl:      calcium citrate (CALCITRATE) 200 mg (950 mg) tablet, [CALCIUM CITRATE (CALCITRATE) 200 MG (950 MG) TABLET] Take 1 tablet by mouth 2 (two) times a day., Disp: , Rfl:      carvedilol (COREG) 6.25 MG tablet, Take 6.25 mg by mouth, Disp: , Rfl:      cholecalciferol, vitamin D3, 5,000 unit Tab, [CHOLECALCIFEROL, VITAMIN D3, 5,000 UNIT TAB] Take 1 tablet  by mouth daily., Disp: , Rfl:      cyanocobalamin, vitamin B-12, 1,000 mcg Subl, [CYANOCOBALAMIN, VITAMIN B-12, 1,000 MCG SUBL] Place 1,000 mcg under the tongue daily., Disp: , Rfl:      escitalopram oxalate (LEXAPRO) 20 MG tablet, [ESCITALOPRAM OXALATE (LEXAPRO) 20 MG TABLET] 1 tablet., Disp: , Rfl:      estradiol (ESTRACE) 0.5 MG tablet, , Disp: , Rfl:      furosemide (LASIX) 40 MG tablet, Take 40 mg by mouth, Disp: , Rfl:      KLOR-CON M20 20 mEq tablet, [KLOR-CON M20 20 MEQ TABLET] Take 1 tablet by mouth daily., Disp: , Rfl:      Magnesium Oxide 250 MG TABS, Take 250 mg by mouth, Disp: , Rfl:      mometasone (NASONEX) 50 mcg/actuation nasal spray, [MOMETASONE (NASONEX) 50 MCG/ACTUATION NASAL SPRAY] 2 sprays into each nostril as needed. , Disp: , Rfl:      MULTIVITAMINS WITH IRON (MULTIVITAMIN WITH IRON ORAL), [MULTIVITAMINS WITH IRON (MULTIVITAMIN WITH IRON ORAL)] Take 1 tablet by mouth 2 times a day at 6:00 am and 4:00 pm., Disp: , Rfl:      mupirocin (BACTROBAN) 2 % external ointment, , Disp: , Rfl:      omeprazole (PRILOSEC) 20 MG DR capsule, Take 1 capsule (20 mg) by mouth daily, Disp: 90 capsule, Rfl: 3     phentermine (ADIPEX-P) 37.5 MG tablet, [PHENTERMINE (ADIPEX-P) 37.5 MG TABLET] Take 1/2 to 1 tablet in the morning., Disp: 90 tablet, Rfl: 1     potassium chloride ER (K-TAB) 20 MEQ CR tablet, , Disp: , Rfl:      traMADol (ULTRAM) 50 mg tablet, [TRAMADOL (ULTRAM) 50 MG TABLET] Take 1 tablet by mouth every morning., Disp: , Rfl:      triamcinolone (KENALOG) 0.1 % cream, [TRIAMCINOLONE (KENALOG) 0.1 % CREAM] Apply 1 application topically 2 (two) times a day., Disp: , Rfl:      valACYclovir (VALTREX) 1000 mg tablet, TAKE 2 TABLETS BY MOUTH TWICE DAILY FOR 1 DAY, Disp: , Rfl:      amoxicillin (AMOXIL) 500 MG capsule, , Disp: , Rfl:      Plan: Refill phentermine. Annual labs in May. Continue vitamins with consistency. Dietitian prn.     Return in about 6 months (around 9/15/2022) for Annual labs in May. The  "team will put in the orders and call you in advance..    Bariatric Surgery Review     Interim History/LifeChanges: Fell hurting her knee. Developed sepsis and was in the hospital for 30 days on IV Abx. Reji has gained 22# from her last visit. She would like to have a refill of phentermine. Recent respiratory illness. Much better now.    Patient Concerns: phentermine refill  Appetite (1-10): up  GERD: full feeling    Medication changes: no    Vitamin Intake:   B-12   SL   MVI  2/d with 18mg iron   Vitamin D  one a day summer, 2 a day winter Thinks 5,000U   Calcium   citrate     Other                LABS: \"Reviewed  Iron low and PTH up a little  Nausea rare  Vomiting a couple of times  Constipation no  Diarrhea no  Rashes no  Hair Loss no  Calf tenderness no  Breathing difficulty no  Reactive Hypoglycemia would have  Light Headedness occ   Moods stable    12 point ROS as above and otherwise negative      Habits:  Alcohol: no  Tobacco: no  Caffeine no  NSAIDS daily ASA  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 Getting 7-10K steps, sometimes 15K Resistance in the water  3 meals/day yes  Protein first yes  60grams/day  Water Separate from meals yes  Calorie Containing Beverages no  Restaurant eating/wk 2/wk  Sleeping well despite naps  Stress low/moderate  CPAP: NA  Contraception: PM  DEXA:2016 normal. She will have it [facility] at [facility]. Order will be faxed.    Social History     Social History     Socioeconomic History     Marital status:      Spouse name: Not on file     Number of children: Not on file     Years of education: Not on file     Highest education level: Not on file   Occupational History     Not on file   Tobacco Use     Smoking status: Never Smoker     Smokeless tobacco: Never Used   Substance and Sexual Activity     Alcohol use: No     Drug use: No     Sexual activity: Yes     Partners: Male   Other Topics Concern     Not on file   Social History Narrative     Not on file "     Social Determinants of Health     Financial Resource Strain: Not on file   Food Insecurity: Not on file   Transportation Needs: Not on file   Physical Activity: Not on file   Stress: Not on file   Social Connections: Not on file   Intimate Partner Violence: Not on file   Housing Stability: Not on file       Past Medical History     Past Medical History:   Diagnosis Date     Back pain      Basal cell carcinoma      Benign positional vertigo      Degenerative disc disease      Degenerative joint disease      Depression      Diabetes mellitus (H)      Dyslipidemia      Exercise-induced asthma      Fibromyalgia      Foot pain      Hip pain      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 Devonte Fox     Hyperparathyroidism (H)      Knee pain      Morbid obesity with BMI of 40.0-44.9, adult (H)      Other and unspecified postsurgical nonabsorption 12/19/2014     Sleep apnea      Urinary, incontinence, stress female      Vitamin D deficiency      Problem List     Patient Active Problem List   Diagnosis     Other and unspecified postsurgical nonabsorption     Hair Loss     Hyperparathyroidism (H)     Abnormal vasomotor function     Anxiety     Arthralgia of hip     Asthma     Dyssomnia     Preoperative evaluation to rule out surgical contraindication     Essential hypertension     History of total hip replacement     Fibromyalgia     Intestinal bypass and anastomosis status     Impaired glucose tolerance     Hyperlipidemia     Squamous cell carcinoma in situ (SCCIS) of skin     Shoulder pain     Pneumonia     Plantar fasciitis     Mixed anxiety depressive disorder     Periodic limb movement disorder     Osteoarthritis of knee     Osteoarthritis     Recurrent major depression in complete remission (H)     Prediabetes     Medications       Current Outpatient Medications:      acetaminophen (TYLENOL) 325 MG tablet, [ACETAMINOPHEN (TYLENOL) 325 MG TABLET] Take 650 mg by mouth every 6 (six) hours as needed for  pain., Disp: , Rfl:      albuterol (PROVENTIL) 2.5 mg /3 mL (0.083 %) nebulizer solution, [ALBUTEROL (PROVENTIL) 2.5 MG /3 ML (0.083 %) NEBULIZER SOLUTION] Take 2.5 mg by nebulization every 6 (six) hours as needed for wheezing., Disp: , Rfl:      aspirin (ASA) 81 MG EC tablet, Take 81 mg by mouth, Disp: , Rfl:      calcium citrate (CALCITRATE) 200 mg (950 mg) tablet, [CALCIUM CITRATE (CALCITRATE) 200 MG (950 MG) TABLET] Take 1 tablet by mouth 2 (two) times a day., Disp: , Rfl:      carvedilol (COREG) 6.25 MG tablet, Take 6.25 mg by mouth, Disp: , Rfl:      cholecalciferol, vitamin D3, 5,000 unit Tab, [CHOLECALCIFEROL, VITAMIN D3, 5,000 UNIT TAB] Take 1 tablet by mouth daily., Disp: , Rfl:      cyanocobalamin, vitamin B-12, 1,000 mcg Subl, [CYANOCOBALAMIN, VITAMIN B-12, 1,000 MCG SUBL] Place 1,000 mcg under the tongue daily., Disp: , Rfl:      escitalopram oxalate (LEXAPRO) 20 MG tablet, [ESCITALOPRAM OXALATE (LEXAPRO) 20 MG TABLET] 1 tablet., Disp: , Rfl:      estradiol (ESTRACE) 0.5 MG tablet, , Disp: , Rfl:      furosemide (LASIX) 40 MG tablet, Take 40 mg by mouth, Disp: , Rfl:      KLOR-CON M20 20 mEq tablet, [KLOR-CON M20 20 MEQ TABLET] Take 1 tablet by mouth daily., Disp: , Rfl:      Magnesium Oxide 250 MG TABS, Take 250 mg by mouth, Disp: , Rfl:      mometasone (NASONEX) 50 mcg/actuation nasal spray, [MOMETASONE (NASONEX) 50 MCG/ACTUATION NASAL SPRAY] 2 sprays into each nostril as needed. , Disp: , Rfl:      MULTIVITAMINS WITH IRON (MULTIVITAMIN WITH IRON ORAL), [MULTIVITAMINS WITH IRON (MULTIVITAMIN WITH IRON ORAL)] Take 1 tablet by mouth 2 times a day at 6:00 am and 4:00 pm., Disp: , Rfl:      mupirocin (BACTROBAN) 2 % external ointment, , Disp: , Rfl:      omeprazole (PRILOSEC) 20 MG DR capsule, Take 1 capsule (20 mg) by mouth daily, Disp: 90 capsule, Rfl: 3     phentermine (ADIPEX-P) 37.5 MG tablet, [PHENTERMINE (ADIPEX-P) 37.5 MG TABLET] Take 1/2 to 1 tablet in the morning., Disp: 90 tablet, Rfl: 1      "potassium chloride ER (K-TAB) 20 MEQ CR tablet, , Disp: , Rfl:      traMADol (ULTRAM) 50 mg tablet, [TRAMADOL (ULTRAM) 50 MG TABLET] Take 1 tablet by mouth every morning., Disp: , Rfl:      triamcinolone (KENALOG) 0.1 % cream, [TRIAMCINOLONE (KENALOG) 0.1 % CREAM] Apply 1 application topically 2 (two) times a day., Disp: , Rfl:      valACYclovir (VALTREX) 1000 mg tablet, TAKE 2 TABLETS BY MOUTH TWICE DAILY FOR 1 DAY, Disp: , Rfl:      amoxicillin (AMOXIL) 500 MG capsule, , Disp: , Rfl:    Surgical History     Past Surgical History  She has a past surgical history that includes Tonsillectomy; Hysterectomy; tubal ligation; Arthroscopy knee; Cholecystectomy; Foot surgery; Wrist Fracture Surgery; and Hysterectomy total abdominal, bilateral salpingo-oophorectomy, combined.    Objective-Exam     Constitutional:  /78 (BP Location: Right arm, Patient Position: Sitting, Cuff Size: Adult Regular)   Ht 1.651 m (5' 5\")   Wt 79.5 kg (175 lb 4.8 oz)   BMI 29.17 kg/m      General:  Pleasant and in no acute distress   Eyes:  EOMI  ENT:  Airway 1+  Moist mucous membranes  Neck:  Supple, No LAD, No thyromegaly, No carotid bruits appreciated  Respiratory: Normal respiratory effort, no cough, wheezes or crackles  CV:  Regular rate and Rhythm,no murmurs, pulses 2+, no calf tenderness, no LE edema  Gastrointestinal: Abdomen NT/ND, BS+  Musculoskeletal: muscle mass WNL  Skin: color tan hair full incisions nicely healed  Neurological: No tremor, normal gait  Psychiatric: alert and oriented X3, mood and affect normal    Counseling     We reviewed the important post op bariatric recommendations:  -eating 3 meals daily  -eating protein first, getting >60gm protein daily  -eating slowly, chewing food well  -avoiding/limiting calorie containing beverages  -drinking water 15-30 minutes before or after meals  -choosing wheat, not white with breads, crackers, pastas, juan, bagels, tortillas, rice  -limiting restaurant or cafeteria eating " to twice a week or less    We discussed the importance of restorative sleep and stress management in maintaining a healthy weight.  We discussed the National Weight Control Registry healthy weight maintenance strategies and ways to optimize metabolism.  We discussed the importance of physical activity including cardiovascular and strength training in maintaining a healthier weight.    We discussed the importance of life-long vitamin supplementation and life-long  follow-up.    Ivy was reminded that, to avoid marginal ulcers she should avoid tobacco at all, alcohol in excess, caffeine in excess, and NSAIDS (unless indicated for cardioprotection or othewise and opposed by a PPI).    Carmen Kirby MD, MD, [facility].  3/15/2022  10:19 AM      No images are attached to the encounter.  Total time spent on the date of this encounter doing: chart review, review of test results, patient visit, physical exam, education, counseling, developing plan of care, and documenting = 30 minutes.      Again, thank you for allowing me to participate in the care of your patient.        Sincerely,        Carmen Kirby MD

## 2022-03-15 NOTE — PROGRESS NOTES
Bariatric Follow Up Visit with a History of Previous Bariatric Surgery     Date of visit: 3/15/2022  Physician: Carmen Kirby MD, MD  Primary Care Provider:  Birgit Man QUAN Jaimes   71 year old  female    Date of Surgery: 4/7/2014  Initial Weight: 243  Initial BMI: 41.3  Today's Weight:   Wt Readings from Last 1 Encounters:   03/15/22 79.5 kg (175 lb 4.8 oz)     Body mass index is 29.17 kg/m .      Assessment and Plan     Assessment: Ivy is a 71 year old year old female who is 8  yrs s/p  Birgit en Y Gastric Bypass with Dr. Ruddy Haynes QUAN Jaimes feels as if she has achieved the goals she hoped to accomplish through bariatric surgery and weight loss.    Encounter Diagnoses   Name Primary?     Postoperative malabsorption Yes     Post-menopausal      Hyperphagia      NSAID sensitivity          Current Outpatient Medications:      acetaminophen (TYLENOL) 325 MG tablet, [ACETAMINOPHEN (TYLENOL) 325 MG TABLET] Take 650 mg by mouth every 6 (six) hours as needed for pain., Disp: , Rfl:      albuterol (PROVENTIL) 2.5 mg /3 mL (0.083 %) nebulizer solution, [ALBUTEROL (PROVENTIL) 2.5 MG /3 ML (0.083 %) NEBULIZER SOLUTION] Take 2.5 mg by nebulization every 6 (six) hours as needed for wheezing., Disp: , Rfl:      aspirin (ASA) 81 MG EC tablet, Take 81 mg by mouth, Disp: , Rfl:      calcium citrate (CALCITRATE) 200 mg (950 mg) tablet, [CALCIUM CITRATE (CALCITRATE) 200 MG (950 MG) TABLET] Take 1 tablet by mouth 2 (two) times a day., Disp: , Rfl:      carvedilol (COREG) 6.25 MG tablet, Take 6.25 mg by mouth, Disp: , Rfl:      cholecalciferol, vitamin D3, 5,000 unit Tab, [CHOLECALCIFEROL, VITAMIN D3, 5,000 UNIT TAB] Take 1 tablet by mouth daily., Disp: , Rfl:      cyanocobalamin, vitamin B-12, 1,000 mcg Subl, [CYANOCOBALAMIN, VITAMIN B-12, 1,000 MCG SUBL] Place 1,000 mcg under the tongue daily., Disp: , Rfl:      escitalopram oxalate (LEXAPRO) 20 MG tablet, [ESCITALOPRAM OXALATE (LEXAPRO) 20 MG TABLET]  1 tablet., Disp: , Rfl:      estradiol (ESTRACE) 0.5 MG tablet, , Disp: , Rfl:      furosemide (LASIX) 40 MG tablet, Take 40 mg by mouth, Disp: , Rfl:      KLOR-CON M20 20 mEq tablet, [KLOR-CON M20 20 MEQ TABLET] Take 1 tablet by mouth daily., Disp: , Rfl:      Magnesium Oxide 250 MG TABS, Take 250 mg by mouth, Disp: , Rfl:      mometasone (NASONEX) 50 mcg/actuation nasal spray, [MOMETASONE (NASONEX) 50 MCG/ACTUATION NASAL SPRAY] 2 sprays into each nostril as needed. , Disp: , Rfl:      MULTIVITAMINS WITH IRON (MULTIVITAMIN WITH IRON ORAL), [MULTIVITAMINS WITH IRON (MULTIVITAMIN WITH IRON ORAL)] Take 1 tablet by mouth 2 times a day at 6:00 am and 4:00 pm., Disp: , Rfl:      mupirocin (BACTROBAN) 2 % external ointment, , Disp: , Rfl:      omeprazole (PRILOSEC) 20 MG DR capsule, Take 1 capsule (20 mg) by mouth daily, Disp: 90 capsule, Rfl: 3     phentermine (ADIPEX-P) 37.5 MG tablet, [PHENTERMINE (ADIPEX-P) 37.5 MG TABLET] Take 1/2 to 1 tablet in the morning., Disp: 90 tablet, Rfl: 1     potassium chloride ER (K-TAB) 20 MEQ CR tablet, , Disp: , Rfl:      traMADol (ULTRAM) 50 mg tablet, [TRAMADOL (ULTRAM) 50 MG TABLET] Take 1 tablet by mouth every morning., Disp: , Rfl:      triamcinolone (KENALOG) 0.1 % cream, [TRIAMCINOLONE (KENALOG) 0.1 % CREAM] Apply 1 application topically 2 (two) times a day., Disp: , Rfl:      valACYclovir (VALTREX) 1000 mg tablet, TAKE 2 TABLETS BY MOUTH TWICE DAILY FOR 1 DAY, Disp: , Rfl:      amoxicillin (AMOXIL) 500 MG capsule, , Disp: , Rfl:      Plan: Refill phentermine. Annual labs in May. Continue vitamins with consistency. Dietitian prn.     Return in about 6 months (around 9/15/2022) for Annual labs in May. The team will put in the orders and call you in advance..    Bariatric Surgery Review     Interim History/LifeChanges: Fell hurting her knee. Developed sepsis and was in the hospital for 30 days on IV Abx. Reji has gained 22# from her last visit. She would like to have a refill of  "phentermine. Recent respiratory illness. Much better now.    Patient Concerns: phentermine refill  Appetite (1-10): up  GERD: full feeling    Medication changes: no    Vitamin Intake:   B-12   SL   MVI  2/d with 18mg iron   Vitamin D  one a day summer, 2 a day winter Thinks 5,000U   Calcium   citrate     Other                LABS: \"Reviewed  Iron low and PTH up a little  Nausea rare  Vomiting a couple of times  Constipation no  Diarrhea no  Rashes no  Hair Loss no  Calf tenderness no  Breathing difficulty no  Reactive Hypoglycemia would have  Light Headedness occ   Moods stable    12 point ROS as above and otherwise negative      Habits:  Alcohol: no  Tobacco: no  Caffeine no  NSAIDS daily ASA  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 Getting 7-10K steps, sometimes 15K Resistance in the water  3 meals/day yes  Protein first yes  60grams/day  Water Separate from meals yes  Calorie Containing Beverages no  Restaurant eating/wk 2/wk  Sleeping well despite naps  Stress low/moderate  CPAP: NA  Contraception: PM  DEXA:2016 normal. She will have it again at [facility]. Order will be faxed.    Social History     Social History     Socioeconomic History     Marital status:      Spouse name: Not on file     Number of children: Not on file     Years of education: Not on file     Highest education level: Not on file   Occupational History     Not on file   Tobacco Use     Smoking status: Never Smoker     Smokeless tobacco: Never Used   Substance and Sexual Activity     Alcohol use: No     Drug use: No     Sexual activity: Yes     Partners: Male   Other Topics Concern     Not on file   Social History Narrative     Not on file     Social Determinants of Health     Financial Resource Strain: Not on file   Food Insecurity: Not on file   Transportation Needs: Not on file   Physical Activity: Not on file   Stress: Not on file   Social Connections: Not on file   Intimate Partner Violence: Not on file "   Housing Stability: Not on file       Past Medical History     Past Medical History:   Diagnosis Date     Back pain      Basal cell carcinoma      Benign positional vertigo      Degenerative disc disease      Degenerative joint disease      Depression      Diabetes mellitus (H)      Dyslipidemia      Exercise-induced asthma      Fibromyalgia      Foot pain      Hip pain      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 Devonte Fox     Hyperparathyroidism (H)      Knee pain      Morbid obesity with BMI of 40.0-44.9, adult (H)      Other and unspecified postsurgical nonabsorption 12/19/2014     Sleep apnea      Urinary, incontinence, stress female      Vitamin D deficiency      Problem List     Patient Active Problem List   Diagnosis     Other and unspecified postsurgical nonabsorption     Hair Loss     Hyperparathyroidism (H)     Abnormal vasomotor function     Anxiety     Arthralgia of hip     Asthma     Dyssomnia     Preoperative evaluation to rule out surgical contraindication     Essential hypertension     History of total hip replacement     Fibromyalgia     Intestinal bypass and anastomosis status     Impaired glucose tolerance     Hyperlipidemia     Squamous cell carcinoma in situ (SCCIS) of skin     Shoulder pain     Pneumonia     Plantar fasciitis     Mixed anxiety depressive disorder     Periodic limb movement disorder     Osteoarthritis of knee     Osteoarthritis     Recurrent major depression in complete remission (H)     Prediabetes     Medications       Current Outpatient Medications:      acetaminophen (TYLENOL) 325 MG tablet, [ACETAMINOPHEN (TYLENOL) 325 MG TABLET] Take 650 mg by mouth every 6 (six) hours as needed for pain., Disp: , Rfl:      albuterol (PROVENTIL) 2.5 mg /3 mL (0.083 %) nebulizer solution, [ALBUTEROL (PROVENTIL) 2.5 MG /3 ML (0.083 %) NEBULIZER SOLUTION] Take 2.5 mg by nebulization every 6 (six) hours as needed for wheezing., Disp: , Rfl:      aspirin (ASA) 81 MG EC  tablet, Take 81 mg by mouth, Disp: , Rfl:      calcium citrate (CALCITRATE) 200 mg (950 mg) tablet, [CALCIUM CITRATE (CALCITRATE) 200 MG (950 MG) TABLET] Take 1 tablet by mouth 2 (two) times a day., Disp: , Rfl:      carvedilol (COREG) 6.25 MG tablet, Take 6.25 mg by mouth, Disp: , Rfl:      cholecalciferol, vitamin D3, 5,000 unit Tab, [CHOLECALCIFEROL, VITAMIN D3, 5,000 UNIT TAB] Take 1 tablet by mouth daily., Disp: , Rfl:      cyanocobalamin, vitamin B-12, 1,000 mcg Subl, [CYANOCOBALAMIN, VITAMIN B-12, 1,000 MCG SUBL] Place 1,000 mcg under the tongue daily., Disp: , Rfl:      escitalopram oxalate (LEXAPRO) 20 MG tablet, [ESCITALOPRAM OXALATE (LEXAPRO) 20 MG TABLET] 1 tablet., Disp: , Rfl:      estradiol (ESTRACE) 0.5 MG tablet, , Disp: , Rfl:      furosemide (LASIX) 40 MG tablet, Take 40 mg by mouth, Disp: , Rfl:      KLOR-CON M20 20 mEq tablet, [KLOR-CON M20 20 MEQ TABLET] Take 1 tablet by mouth daily., Disp: , Rfl:      Magnesium Oxide 250 MG TABS, Take 250 mg by mouth, Disp: , Rfl:      mometasone (NASONEX) 50 mcg/actuation nasal spray, [MOMETASONE (NASONEX) 50 MCG/ACTUATION NASAL SPRAY] 2 sprays into each nostril as needed. , Disp: , Rfl:      MULTIVITAMINS WITH IRON (MULTIVITAMIN WITH IRON ORAL), [MULTIVITAMINS WITH IRON (MULTIVITAMIN WITH IRON ORAL)] Take 1 tablet by mouth 2 times a day at 6:00 am and 4:00 pm., Disp: , Rfl:      mupirocin (BACTROBAN) 2 % external ointment, , Disp: , Rfl:      omeprazole (PRILOSEC) 20 MG DR capsule, Take 1 capsule (20 mg) by mouth daily, Disp: 90 capsule, Rfl: 3     phentermine (ADIPEX-P) 37.5 MG tablet, [PHENTERMINE (ADIPEX-P) 37.5 MG TABLET] Take 1/2 to 1 tablet in the morning., Disp: 90 tablet, Rfl: 1     potassium chloride ER (K-TAB) 20 MEQ CR tablet, , Disp: , Rfl:      traMADol (ULTRAM) 50 mg tablet, [TRAMADOL (ULTRAM) 50 MG TABLET] Take 1 tablet by mouth every morning., Disp: , Rfl:      triamcinolone (KENALOG) 0.1 % cream, [TRIAMCINOLONE (KENALOG) 0.1 % CREAM] Apply 1  "application topically 2 (two) times a day., Disp: , Rfl:      valACYclovir (VALTREX) 1000 mg tablet, TAKE 2 TABLETS BY MOUTH TWICE DAILY FOR 1 DAY, Disp: , Rfl:      amoxicillin (AMOXIL) 500 MG capsule, , Disp: , Rfl:    Surgical History     Past Surgical History  She has a past surgical history that includes Tonsillectomy; Hysterectomy; tubal ligation; Arthroscopy knee; Cholecystectomy; Foot surgery; Wrist Fracture Surgery; and Hysterectomy total abdominal, bilateral salpingo-oophorectomy, combined.    Objective-Exam     Constitutional:  /78 (BP Location: Right arm, Patient Position: Sitting, Cuff Size: Adult Regular)   Ht 1.651 m (5' 5\")   Wt 79.5 kg (175 lb 4.8 oz)   BMI 29.17 kg/m      General:  Pleasant and in no acute distress   Eyes:  EOMI  ENT:  Airway 1+  Moist mucous membranes  Neck:  Supple, No LAD, No thyromegaly, No carotid bruits appreciated  Respiratory: Normal respiratory effort, no cough, wheezes or crackles  CV:  Regular rate and Rhythm,no murmurs, pulses 2+, no calf tenderness, no LE edema  Gastrointestinal: Abdomen NT/ND, BS+  Musculoskeletal: muscle mass WNL  Skin: color tan hair full incisions nicely healed  Neurological: No tremor, normal gait  Psychiatric: alert and oriented X3, mood and affect normal    Counseling     We reviewed the important post op bariatric recommendations:  -eating 3 meals daily  -eating protein first, getting >60gm protein daily  -eating slowly, chewing food well  -avoiding/limiting calorie containing beverages  -drinking water 15-30 minutes before or after meals  -choosing wheat, not white with breads, crackers, pastas, juan, bagels, tortillas, rice  -limiting restaurant or cafeteria eating to twice a week or less    We discussed the importance of restorative sleep and stress management in maintaining a healthy weight.  We discussed the National Weight Control Registry healthy weight maintenance strategies and ways to optimize metabolism.  We discussed the " importance of physical activity including cardiovascular and strength training in maintaining a healthier weight.    We discussed the importance of life-long vitamin supplementation and life-long  follow-up.    Ivy was reminded that, to avoid marginal ulcers she should avoid tobacco at all, alcohol in excess, caffeine in excess, and NSAIDS (unless indicated for cardioprotection or othewise and opposed by a PPI).    Carmen Kirby MD, MD, [facility].  3/15/2022  10:19 AM      No images are attached to the encounter.  Total time spent on the date of this encounter doing: chart review, review of test results, patient visit, physical exam, education, counseling, developing plan of care, and documenting = 30 minutes.

## 2023-01-11 ENCOUNTER — TELEPHONE (OUTPATIENT)
Dept: SURGERY | Facility: CLINIC | Age: 73
End: 2023-01-11

## 2023-01-11 NOTE — TELEPHONE ENCOUNTER
Reason for Call:  Medication or medication refill: medication refill   Do you use a [facility]?  Name of the pharmacy and phone number for the current request: [facility] [address] [place] [address]    Name of the medication requested: phentermine (ADIPEX-P) 37.5 MG tablet  Other request: n/a    Can we leave a detailed message on this number? YES    Phone number patient can be reached at: Cell number on file:    Telephone Information:   Mobile [phone]       Best Time: Anytime     Call taken on 1/11/2023 at 8:36 AM by Rayna Balderrama

## 2023-01-11 NOTE — TELEPHONE ENCOUNTER
Left a message for the patient letting her know that she will need an appointment with Dr. Kirby or the dietitian in order to have the medication refilled.  She is scheduled to see Dr. Kirby in March for an annual visit but it has been since March of last year since she has been seen. Gave her the number to call and schedule.  Mara Robb RN

## 2023-02-27 DIAGNOSIS — K90.9 INTESTINAL MALABSORPTION: ICD-10-CM

## 2023-02-27 DIAGNOSIS — Z98.84 S/P BARIATRIC SURGERY: Primary | ICD-10-CM

## 2023-02-27 DIAGNOSIS — K91.2 POSTOPERATIVE MALABSORPTION: ICD-10-CM

## 2023-02-27 NOTE — PROGRESS NOTES
9 yrs post op lab orders placed for patient and sent to [facility], fax number= [phone] and mailed to pt at home in preparation for appointment with  in March.    Patricia Armando RN, CBN  [facility]  P [phone]  F [phone]

## 2023-03-14 PROBLEM — M81.0 OSTEOPOROSIS: Status: ACTIVE | Noted: 2023-03-14

## 2023-03-14 PROBLEM — N18.31 STAGE 3A CHRONIC KIDNEY DISEASE (CKD) (H): Status: ACTIVE | Noted: 2023-02-15

## 2023-03-14 PROBLEM — M62.89 PELVIC FLOOR DYSFUNCTION: Status: ACTIVE | Noted: 2023-03-14

## 2023-03-14 PROBLEM — E78.5 DYSLIPIDEMIA: Status: ACTIVE | Noted: 2023-02-15

## 2023-03-14 PROBLEM — K52.832 LYMPHOCYTIC COLITIS: Status: ACTIVE | Noted: 2023-02-15

## 2023-03-16 ENCOUNTER — OFFICE VISIT (OUTPATIENT)
Dept: SURGERY | Facility: CLINIC | Age: 73
End: 2023-03-16
Payer: MEDICARE

## 2023-03-16 VITALS
WEIGHT: 170 LBS | DIASTOLIC BLOOD PRESSURE: 80 MMHG | HEIGHT: 65 IN | BODY MASS INDEX: 28.32 KG/M2 | SYSTOLIC BLOOD PRESSURE: 134 MMHG

## 2023-03-16 DIAGNOSIS — R63.2 HYPERPHAGIA: ICD-10-CM

## 2023-03-16 DIAGNOSIS — K91.2 POSTOPERATIVE MALABSORPTION: Primary | ICD-10-CM

## 2023-03-16 PROCEDURE — 99214 OFFICE O/P EST MOD 30 MIN: CPT | Performed by: FAMILY MEDICINE

## 2023-03-16 RX ORDER — PHENTERMINE HYDROCHLORIDE 37.5 MG/1
TABLET ORAL
Qty: 90 TABLET | Refills: 1 | Status: SHIPPED | OUTPATIENT
Start: 2023-03-16

## 2023-03-16 RX ORDER — FERROUS GLUCONATE 324(37.5)
TABLET ORAL
COMMUNITY

## 2023-03-16 RX ORDER — BUDESONIDE 3 MG/1
CAPSULE, COATED PELLETS ORAL
COMMUNITY
Start: 2023-03-13

## 2023-03-16 NOTE — PROGRESS NOTES
Bariatric Follow Up Visit with a History of Previous Bariatric Surgery     Date of visit: 3/16/2023  Physician: Carmen Kirby MD, MD  Primary Care Provider:  No primary care provider on file.  Ivy Jaimes   72 year old  female    Date of Surgery: 4/7/2014  Initial Weight: 248#  Initial BMI: 41.3  Today's Weight:   Wt Readings from Last 1 Encounters:   03/16/23 77.1 kg (170 lb)     Body mass index is 28.29 kg/m .      Assessment and Plan     Assessment: Ivy is a 72 year old year old female who is 9 yrs s/p  Birgit en Y Gastric Bypass with Dr. Fox  Ivy Jaimes feels as if she has achieved the goals she hoped to accomplish through bariatric surgery and weight loss.    Encounter Diagnosis   Name Primary?     Postoperative malabsorption Yes         Current Outpatient Medications:      acetaminophen (TYLENOL) 325 MG tablet, [ACETAMINOPHEN (TYLENOL) 325 MG TABLET] Take 650 mg by mouth every 6 (six) hours as needed for pain., Disp: , Rfl:      albuterol (PROVENTIL) 2.5 mg /3 mL (0.083 %) nebulizer solution, [ALBUTEROL (PROVENTIL) 2.5 MG /3 ML (0.083 %) NEBULIZER SOLUTION] Take 2.5 mg by nebulization every 6 (six) hours as needed for wheezing., Disp: , Rfl:      amoxicillin (AMOXIL) 500 MG capsule, , Disp: , Rfl:      aspirin (ASA) 81 MG EC tablet, Take 81 mg by mouth, Disp: , Rfl:      budesonide (ENTOCORT EC) 3 MG EC capsule, , Disp: , Rfl:      calcium citrate (CALCITRATE) 200 mg (950 mg) tablet, [CALCIUM CITRATE (CALCITRATE) 200 MG (950 MG) TABLET] Take 1 tablet by mouth 2 (two) times a day., Disp: , Rfl:      carvedilol (COREG) 6.25 MG tablet, Take 6.25 mg by mouth, Disp: , Rfl:      cholecalciferol, vitamin D3, 5,000 unit Tab, [CHOLECALCIFEROL, VITAMIN D3, 5,000 UNIT TAB] Take 1 tablet by mouth daily., Disp: , Rfl:      cyanocobalamin, vitamin B-12, 1,000 mcg Subl, [CYANOCOBALAMIN, VITAMIN B-12, 1,000 MCG SUBL] Place 1,000 mcg under the tongue daily., Disp: , Rfl:      escitalopram oxalate  (LEXAPRO) 20 MG tablet, [ESCITALOPRAM OXALATE (LEXAPRO) 20 MG TABLET] 1 tablet., Disp: , Rfl:      estradiol (ESTRACE) 0.5 MG tablet, , Disp: , Rfl:      Ferrous Gluconate 324 (37.5 Fe) MG TABS, , Disp: , Rfl:      furosemide (LASIX) 40 MG tablet, Take 40 mg by mouth, Disp: , Rfl:      KLOR-CON M20 20 mEq tablet, [KLOR-CON M20 20 MEQ TABLET] Take 1 tablet by mouth daily., Disp: , Rfl:      Magnesium Oxide 250 MG TABS, Take 250 mg by mouth, Disp: , Rfl:      mometasone (NASONEX) 50 mcg/actuation nasal spray, [MOMETASONE (NASONEX) 50 MCG/ACTUATION NASAL SPRAY] 2 sprays into each nostril as needed. , Disp: , Rfl:      MULTIVITAMINS WITH IRON (MULTIVITAMIN WITH IRON ORAL), [MULTIVITAMINS WITH IRON (MULTIVITAMIN WITH IRON ORAL)] Take 1 tablet by mouth 2 times a day at 6:00 am and 4:00 pm., Disp: , Rfl:      mupirocin (BACTROBAN) 2 % external ointment, , Disp: , Rfl:      potassium chloride ER (K-TAB) 20 MEQ CR tablet, , Disp: , Rfl:      traMADol (ULTRAM) 50 mg tablet, [TRAMADOL (ULTRAM) 50 MG TABLET] Take 1 tablet by mouth every morning., Disp: , Rfl:      triamcinolone (KENALOG) 0.1 % cream, [TRIAMCINOLONE (KENALOG) 0.1 % CREAM] Apply 1 application topically 2 (two) times a day., Disp: , Rfl:      valACYclovir (VALTREX) 1000 mg tablet, TAKE 2 TABLETS BY MOUTH TWICE DAILY FOR 1 DAY, Disp: , Rfl:      phentermine (ADIPEX-P) 37.5 MG tablet, [PHENTERMINE (ADIPEX-P) 37.5 MG TABLET] Take 1/2 to 1 tablet in the morning. (Patient not taking: Reported on 3/16/2023), Disp: 90 tablet, Rfl: 1     Plan: refill phentermine. Continue vitamins with increase in vitamin D. If it is 2,000U then change to 5,000/d. If it is 5,000U then increase by doubling dose 3 times a week. This will bring down PTH. Continue same calcium intake.     Return in about 1 year (around 3/16/2024).    Bariatric Surgery Review     Interim History/LifeChanges: Diagnosed with colitis after enteropathic e-coli infection and  6 months of symptoms and delay in  "access to colonoscopy. Weight was down, now coming back up with steroids. Will be on 9mg budesonide. Iron has helped bring up hemoglobin nicely. PTH was eleveted mildly last year and remains mildly eleveted. Taking vitamin D daily but unsure of strenght. Has a trip to [place] planned for June and is taking the summer off for the first time since 1972.    Patient Concerns: weight is up. Phentermine refill  Appetite (1-10): up on steroids  GERD: on PPI    Medication changes: steroids    Vitamin Intake:   B-12   SL   MVI  2/d   Vitamin D  ?    Calcium   citrate     Other  iron              LABS: \"Reviewed A1c 6.1 down from 6.3 last year  Nausea no  Vomiting no  Constipation no  Diarrhea yes  Rashes no  Hair Loss no  Calf tenderness no  Breathing difficulty no  Reactive Hypoglycemia avoids  Light Headedness no   Moods euthymic    12 point ROS as above and otherwise negative      Habits:  Alcohol: no  Tobacco: no  Caffeine no  NSAIDS no  Exercise Routine: daily teaching water aerobice, teaching pickle ball  3 meals/day yes  Protein first yes  60 grams/day  Water Separate from meals B:wheat and turkey and cheese or lean ham and cheese or greek yogurt and fruit. L: leftovers or salad D: coleslaw, egg rolls, grapes   Calorie Containing Beverages no  Restaurant eating/wk <2  Sleeping well  Stress low  CPAP: NA  Contraception: PM  DEXA:may 4th scheduled    Social History     Social History     Socioeconomic History     Marital status:      Spouse name: Not on file     Number of children: Not on file     Years of education: Not on file     Highest education level: Not on file   Occupational History     Not on file   Tobacco Use     Smoking status: Never     Smokeless tobacco: Never   Substance and Sexual Activity     Alcohol use: No     Drug use: No     Sexual activity: Yes     Partners: Male   Other Topics Concern     Not on file   Social History Narrative    . Lives with her . 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, teaches pickleball    2 children     Social Determinants of Health     Financial Resource Strain: Not on file   Food Insecurity: Not on file   Transportation Needs: Not on file   Physical Activity: Not on file   Stress: Not on file   Social Connections: Not on file   Intimate Partner Violence: Not on file   Housing Stability: Not on file       Past Medical History     Past Medical History:   Diagnosis Date     Back pain      Basal cell carcinoma      Benign positional vertigo      Degenerative disc disease      Degenerative joint disease      Depression      Diabetes mellitus (H)      Dyslipidemia      Exercise-induced asthma      Fibromyalgia      Foot pain      Hip pain      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 Devonte Fox     Hyperparathyroidism (H)      Knee pain      Morbid obesity with BMI of 40.0-44.9, adult (H)      Other and unspecified postsurgical nonabsorption 12/19/2014     Sleep apnea      Urinary, incontinence, stress female      Vitamin D deficiency      Problem List     Patient Active Problem List   Diagnosis     Other and unspecified postsurgical nonabsorption     Hair Loss     Hyperparathyroidism (H)     Abnormal vasomotor function     Anxiety     Arthralgia of hip     Asthma     Dyssomnia     Preoperative evaluation to rule out surgical contraindication     Essential hypertension     History of total hip replacement     Fibromyalgia     Intestinal bypass and anastomosis status     Impaired glucose tolerance     Hyperlipidemia     Squamous cell carcinoma in situ (SCCIS) of skin     Shoulder pain     Pneumonia     Plantar fasciitis     Mixed anxiety depressive disorder     Periodic limb movement disorder     Osteoarthritis of knee     Osteoarthritis     Recurrent major depression in complete remission (H)     Prediabetes     Lymphocytic colitis     Dyslipidemia     Osteoporosis     Pelvic floor dysfunction     Stage 3a chronic kidney disease  (CKD) (H)     Medications       Current Outpatient Medications:      acetaminophen (TYLENOL) 325 MG tablet, [ACETAMINOPHEN (TYLENOL) 325 MG TABLET] Take 650 mg by mouth every 6 (six) hours as needed for pain., Disp: , Rfl:      albuterol (PROVENTIL) 2.5 mg /3 mL (0.083 %) nebulizer solution, [ALBUTEROL (PROVENTIL) 2.5 MG /3 ML (0.083 %) NEBULIZER SOLUTION] Take 2.5 mg by nebulization every 6 (six) hours as needed for wheezing., Disp: , Rfl:      amoxicillin (AMOXIL) 500 MG capsule, , Disp: , Rfl:      aspirin (ASA) 81 MG EC tablet, Take 81 mg by mouth, Disp: , Rfl:      budesonide (ENTOCORT EC) 3 MG EC capsule, , Disp: , Rfl:      calcium citrate (CALCITRATE) 200 mg (950 mg) tablet, [CALCIUM CITRATE (CALCITRATE) 200 MG (950 MG) TABLET] Take 1 tablet by mouth 2 (two) times a day., Disp: , Rfl:      carvedilol (COREG) 6.25 MG tablet, Take 6.25 mg by mouth, Disp: , Rfl:      cholecalciferol, vitamin D3, 5,000 unit Tab, [CHOLECALCIFEROL, VITAMIN D3, 5,000 UNIT TAB] Take 1 tablet by mouth daily., Disp: , Rfl:      cyanocobalamin, vitamin B-12, 1,000 mcg Subl, [CYANOCOBALAMIN, VITAMIN B-12, 1,000 MCG SUBL] Place 1,000 mcg under the tongue daily., Disp: , Rfl:      escitalopram oxalate (LEXAPRO) 20 MG tablet, [ESCITALOPRAM OXALATE (LEXAPRO) 20 MG TABLET] 1 tablet., Disp: , Rfl:      estradiol (ESTRACE) 0.5 MG tablet, , Disp: , Rfl:      Ferrous Gluconate 324 (37.5 Fe) MG TABS, , Disp: , Rfl:      furosemide (LASIX) 40 MG tablet, Take 40 mg by mouth, Disp: , Rfl:      KLOR-CON M20 20 mEq tablet, [KLOR-CON M20 20 MEQ TABLET] Take 1 tablet by mouth daily., Disp: , Rfl:      Magnesium Oxide 250 MG TABS, Take 250 mg by mouth, Disp: , Rfl:      mometasone (NASONEX) 50 mcg/actuation nasal spray, [MOMETASONE (NASONEX) 50 MCG/ACTUATION NASAL SPRAY] 2 sprays into each nostril as needed. , Disp: , Rfl:      MULTIVITAMINS WITH IRON (MULTIVITAMIN WITH IRON ORAL), [MULTIVITAMINS WITH IRON (MULTIVITAMIN WITH IRON ORAL)] Take 1 tablet by  "mouth 2 times a day at 6:00 am and 4:00 pm., Disp: , Rfl:      mupirocin (BACTROBAN) 2 % external ointment, , Disp: , Rfl:      potassium chloride ER (K-TAB) 20 MEQ CR tablet, , Disp: , Rfl:      traMADol (ULTRAM) 50 mg tablet, [TRAMADOL (ULTRAM) 50 MG TABLET] Take 1 tablet by mouth every morning., Disp: , Rfl:      triamcinolone (KENALOG) 0.1 % cream, [TRIAMCINOLONE (KENALOG) 0.1 % CREAM] Apply 1 application topically 2 (two) times a day., Disp: , Rfl:      valACYclovir (VALTREX) 1000 mg tablet, TAKE 2 TABLETS BY MOUTH TWICE DAILY FOR 1 DAY, Disp: , Rfl:      phentermine (ADIPEX-P) 37.5 MG tablet, [PHENTERMINE (ADIPEX-P) 37.5 MG TABLET] Take 1/2 to 1 tablet in the morning. (Patient not taking: Reported on 3/16/2023), Disp: 90 tablet, Rfl: 1   Surgical History     Past Surgical History  She has a past surgical history that includes Tonsillectomy; Hysterectomy; tubal ligation; Arthroscopy knee; Cholecystectomy; Foot surgery; Wrist Fracture Surgery; and Hysterectomy total abdominal, bilateral salpingo-oophorectomy, combined.    Objective-Exam     Constitutional:  /80   Ht 1.651 m (5' 5\")   Wt 77.1 kg (170 lb)   Breastfeeding No   BMI 28.29 kg/m      General:  Pleasant and in no acute distress   Eyes:  EOMI  ENT:  Airway 1+  Moist mucous membranes  Neck:  Supple, No LAD, No thyromegaly, No carotid bruits appreciated  Respiratory: Normal respiratory effort, no cough, wheezes or crackles  CV:  Regular rate and Rhythm,no murmurs, pulses 2+, no calf tenderness, no LE edema  Gastrointestinal: Abdomen NT/ND, BS+  Musculoskeletal: muscle mass WNL  Skin: color tan hair full, incisions nicely healed  Neurological: No tremor, normal gait  Psychiatric: alert and oriented X3, mood and affect normal    Counseling     We reviewed the important post op bariatric recommendations:  -eating 3 meals daily  -eating protein first, getting >60gm protein daily  -eating slowly, chewing food well  -avoiding/limiting calorie " containing beverages  -drinking water 15-30 minutes before or after meals  -choosing wheat, not white with breads, crackers, pastas, juan, bagels, tortillas, rice  -limiting restaurant or cafeteria eating to twice a week or less    We discussed the importance of restorative sleep and stress management in maintaining a healthy weight.  We discussed the National Weight Control Registry healthy weight maintenance strategies and ways to optimize metabolism.  We discussed the importance of physical activity including cardiovascular and strength training in maintaining a healthier weight.    We discussed the importance of life-long vitamin supplementation and life-long  follow-up.    Ivy was reminded that, to avoid marginal ulcers she should avoid tobacco at all, alcohol in excess, caffeine in excess, and NSAIDS (unless indicated for cardioprotection or othewise and opposed by a PPI).    Carmen Kirby MD, MD, [facility].  3/16/2023  8:42 AM      No images are attached to the encounter.  Total time spent on the date of this encounter doing: chart review, review of test results, patient visit, physical exam, education, counseling, developing plan of care, and documenting = 30 minutes.